# Patient Record
Sex: FEMALE | Race: WHITE | NOT HISPANIC OR LATINO | Employment: UNEMPLOYED | URBAN - METROPOLITAN AREA
[De-identification: names, ages, dates, MRNs, and addresses within clinical notes are randomized per-mention and may not be internally consistent; named-entity substitution may affect disease eponyms.]

---

## 2023-08-11 ENCOUNTER — HOSPITAL ENCOUNTER (EMERGENCY)
Facility: HOSPITAL | Age: 38
Discharge: HOME | End: 2023-08-11
Attending: EMERGENCY MEDICINE
Payer: MEDICAID

## 2023-08-11 VITALS
TEMPERATURE: 99 F | SYSTOLIC BLOOD PRESSURE: 164 MMHG | HEART RATE: 89 BPM | BODY MASS INDEX: 48.76 KG/M2 | HEIGHT: 62 IN | WEIGHT: 265 LBS | OXYGEN SATURATION: 97 % | RESPIRATION RATE: 18 BRPM | DIASTOLIC BLOOD PRESSURE: 97 MMHG

## 2023-08-11 DIAGNOSIS — S39.012A STRAIN OF LUMBAR REGION, INITIAL ENCOUNTER: ICD-10-CM
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
## 2023-08-11 DIAGNOSIS — S09.90XA MINOR CLOSED HEAD INJURY: ICD-10-CM

## 2023-08-11 DIAGNOSIS — S00.03XA CONTUSION OF SCALP, INITIAL ENCOUNTER: ICD-10-CM

## 2023-08-11 LAB
B-HCG UR QL: NEGATIVE
CTP QC/QA: YES

## 2023-08-11 PROCEDURE — 81025 URINE PREGNANCY TEST: CPT | Performed by: NURSE PRACTITIONER

## 2023-08-11 PROCEDURE — 99284 EMERGENCY DEPT VISIT MOD MDM: CPT | Mod: 25
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
## 2023-08-12 NOTE — DISCHARGE INSTRUCTIONS
RETURN TO EMERGENCY DEPARTMENT WITHOUT FAIL, IF YOUR SYMPTOMS WORSEN, IF YOU GET NEW OR DIFFERENT SYMPTOMS, IF YOU ARE UNABLE TO FOLLOW UP AS DIRECTED, OR IF YOU HAVE ANY CONCERNS OR WORRIES.    Take your chronic pain medication as directed.  Take muscle relaxer as needed for pain.  Follow-up with primary care provider.
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
## 2023-09-07 DIAGNOSIS — G89.29 CHRONIC MIDLINE LOW BACK PAIN WITH SCIATICA, SCIATICA LATERALITY UNSPECIFIED: ICD-10-CM

## 2023-09-07 DIAGNOSIS — M54.2 NECK PAIN: Primary | ICD-10-CM

## 2023-09-07 DIAGNOSIS — M54.10 RADICULOPATHY AFFECTING UPPER EXTREMITY: ICD-10-CM

## 2023-09-07 DIAGNOSIS — M54.40 CHRONIC MIDLINE LOW BACK PAIN WITH SCIATICA, SCIATICA LATERALITY UNSPECIFIED: ICD-10-CM

## 2023-09-21 ENCOUNTER — HOSPITAL ENCOUNTER (OUTPATIENT)
Dept: RADIOLOGY | Facility: HOSPITAL | Age: 38
Discharge: HOME | End: 2023-09-21
Attending: INTERNAL MEDICINE
Payer: MEDICAID

## 2023-09-21 DIAGNOSIS — M54.2 NECK PAIN: ICD-10-CM

## 2023-09-21 DIAGNOSIS — G89.29 CHRONIC MIDLINE LOW BACK PAIN WITH SCIATICA, SCIATICA LATERALITY UNSPECIFIED: ICD-10-CM

## 2023-09-21 DIAGNOSIS — M54.10 RADICULOPATHY AFFECTING UPPER EXTREMITY: ICD-10-CM

## 2023-09-21 DIAGNOSIS — M54.40 CHRONIC MIDLINE LOW BACK PAIN WITH SCIATICA, SCIATICA LATERALITY UNSPECIFIED: ICD-10-CM

## 2023-09-21 PROCEDURE — 72148 MRI LUMBAR SPINE WITHOUT CONTRAST: ICD-10-PCS | Mod: 26,,, | Performed by: RADIOLOGY

## 2023-09-21 PROCEDURE — 72141 MRI CERVICAL SPINE WITHOUT CONTRAST: ICD-10-PCS | Mod: 26,,, | Performed by: RADIOLOGY

## 2023-09-21 PROCEDURE — 72141 MRI NECK SPINE W/O DYE: CPT | Mod: 26,,, | Performed by: RADIOLOGY

## 2023-09-21 PROCEDURE — 72148 MRI LUMBAR SPINE W/O DYE: CPT | Mod: TC

## 2023-09-21 PROCEDURE — 72141 MRI NECK SPINE W/O DYE: CPT | Mod: TC

## 2023-09-21 PROCEDURE — 72148 MRI LUMBAR SPINE W/O DYE: CPT | Mod: 26,,, | Performed by: RADIOLOGY

## 2024-01-23 ENCOUNTER — LAB VISIT (OUTPATIENT)
Dept: LAB | Facility: HOSPITAL | Age: 39
End: 2024-01-23
Attending: NURSE PRACTITIONER
Payer: MEDICAID

## 2024-01-23 DIAGNOSIS — G62.9 PERIPHERAL NERVE DISORDER: Primary | ICD-10-CM

## 2024-01-23 LAB
ALBUMIN SERPL BCP-MCNC: 4.2 G/DL (ref 3.5–5.2)
ALBUMIN SERPL BCP-MCNC: 4.2 G/DL (ref 3.5–5.2)
ALP SERPL-CCNC: 90 U/L (ref 55–135)
ALP SERPL-CCNC: 90 U/L (ref 55–135)
ALT SERPL W/O P-5'-P-CCNC: 31 U/L (ref 10–44)
ALT SERPL W/O P-5'-P-CCNC: 31 U/L (ref 10–44)
ANION GAP SERPL CALC-SCNC: 7 MMOL/L (ref 8–16)
AST SERPL-CCNC: 30 U/L (ref 10–40)
AST SERPL-CCNC: 30 U/L (ref 10–40)
BASOPHILS # BLD AUTO: 0.05 K/UL (ref 0–0.2)
BASOPHILS NFR BLD: 0.5 % (ref 0–1.9)
BILIRUB DIRECT SERPL-MCNC: 0.1 MG/DL (ref 0.1–0.3)
BILIRUB SERPL-MCNC: 0.4 MG/DL (ref 0.1–1)
BILIRUB SERPL-MCNC: 0.4 MG/DL (ref 0.1–1)
BILIRUB UR QL STRIP: NEGATIVE
BUN SERPL-MCNC: 11 MG/DL (ref 6–20)
CALCIUM SERPL-MCNC: 9.3 MG/DL (ref 8.7–10.5)
CHLORIDE SERPL-SCNC: 100 MMOL/L (ref 95–110)
CLARITY UR: CLEAR
CO2 SERPL-SCNC: 29 MMOL/L (ref 23–29)
COLOR UR: YELLOW
CREAT SERPL-MCNC: 0.7 MG/DL (ref 0.5–1.4)
CREAT SERPL-MCNC: 0.7 MG/DL (ref 0.5–1.4)
DIFFERENTIAL METHOD BLD: ABNORMAL
EOSINOPHIL # BLD AUTO: 0.3 K/UL (ref 0–0.5)
EOSINOPHIL NFR BLD: 3.2 % (ref 0–8)
ERYTHROCYTE [DISTWIDTH] IN BLOOD BY AUTOMATED COUNT: 14 % (ref 11.5–14.5)
ERYTHROCYTE [SEDIMENTATION RATE] IN BLOOD BY WESTERGREN METHOD: 21 MM/HR (ref 0–20)
EST. GFR  (NO RACE VARIABLE): >60 ML/MIN/1.73 M^2
EST. GFR  (NO RACE VARIABLE): >60 ML/MIN/1.73 M^2
ESTIMATED AVG GLUCOSE: 114 MG/DL (ref 68–131)
FOLATE SERPL-MCNC: 6.9 NG/ML (ref 4–24)
GLUCOSE SERPL-MCNC: 81 MG/DL (ref 70–110)
GLUCOSE UR QL STRIP: NEGATIVE
HBA1C MFR BLD: 5.6 % (ref 4.5–6.2)
HCT VFR BLD AUTO: 39.9 % (ref 37–48.5)
HGB BLD-MCNC: 12.6 G/DL (ref 12–16)
HGB UR QL STRIP: NEGATIVE
IMM GRANULOCYTES # BLD AUTO: 0.03 K/UL (ref 0–0.04)
IMM GRANULOCYTES NFR BLD AUTO: 0.3 % (ref 0–0.5)
KETONES UR QL STRIP: NEGATIVE
LEUKOCYTE ESTERASE UR QL STRIP: NEGATIVE
LYMPHOCYTES # BLD AUTO: 3.2 K/UL (ref 1–4.8)
LYMPHOCYTES NFR BLD: 33.1 % (ref 18–48)
MCH RBC QN AUTO: 28.4 PG (ref 27–31)
MCHC RBC AUTO-ENTMCNC: 31.6 G/DL (ref 32–36)
MCV RBC AUTO: 90 FL (ref 82–98)
MONOCYTES # BLD AUTO: 0.6 K/UL (ref 0.3–1)
MONOCYTES NFR BLD: 5.6 % (ref 4–15)
NEUTROPHILS # BLD AUTO: 5.6 K/UL (ref 1.8–7.7)
NEUTROPHILS NFR BLD: 57.3 % (ref 38–73)
NITRITE UR QL STRIP: NEGATIVE
NRBC BLD-RTO: 0 /100 WBC
PH UR STRIP: 8 [PH] (ref 5–8)
PLATELET # BLD AUTO: 276 K/UL (ref 150–450)
PLATELET BLD QL SMEAR: ABNORMAL
PMV BLD AUTO: 11.4 FL (ref 9.2–12.9)
POTASSIUM SERPL-SCNC: 4.1 MMOL/L (ref 3.5–5.1)
PROT SERPL-MCNC: 7.8 G/DL (ref 6–8.4)
PROT SERPL-MCNC: 7.8 G/DL (ref 6–8.4)
PROT UR QL STRIP: NEGATIVE
RBC # BLD AUTO: 4.43 M/UL (ref 4–5.4)
RHEUMATOID FACT SERPL-ACNC: <10 IU/ML (ref 0–15)
SODIUM SERPL-SCNC: 136 MMOL/L (ref 136–145)
SP GR UR STRIP: 1.02 (ref 1–1.03)
T4 FREE SERPL-MCNC: 0.81 NG/DL (ref 0.71–1.51)
TSH SERPL DL<=0.005 MIU/L-ACNC: 6.19 UIU/ML (ref 0.34–5.6)
URN SPEC COLLECT METH UR: ABNORMAL
UROBILINOGEN UR STRIP-ACNC: ABNORMAL EU/DL
VIT B12 SERPL-MCNC: 240 PG/ML (ref 210–950)
WBC # BLD AUTO: 9.76 K/UL (ref 3.9–12.7)

## 2024-01-23 PROCEDURE — 84443 ASSAY THYROID STIM HORMONE: CPT | Performed by: NURSE PRACTITIONER

## 2024-01-23 PROCEDURE — 86803 HEPATITIS C AB TEST: CPT | Performed by: NURSE PRACTITIONER

## 2024-01-23 PROCEDURE — 82746 ASSAY OF FOLIC ACID SERUM: CPT | Performed by: NURSE PRACTITIONER

## 2024-01-23 PROCEDURE — 86038 ANTINUCLEAR ANTIBODIES: CPT | Performed by: NURSE PRACTITIONER

## 2024-01-23 PROCEDURE — 82595 ASSAY OF CRYOGLOBULIN: CPT | Performed by: NURSE PRACTITIONER

## 2024-01-23 PROCEDURE — 86335 IMMUNFIX E-PHORSIS/URINE/CSF: CPT | Performed by: NURSE PRACTITIONER

## 2024-01-23 PROCEDURE — 85651 RBC SED RATE NONAUTOMATED: CPT | Performed by: NURSE PRACTITIONER

## 2024-01-23 PROCEDURE — 86235 NUCLEAR ANTIGEN ANTIBODY: CPT | Performed by: NURSE PRACTITIONER

## 2024-01-23 PROCEDURE — 84439 ASSAY OF FREE THYROXINE: CPT | Performed by: NURSE PRACTITIONER

## 2024-01-23 PROCEDURE — 30000890 HC MISC. SEND OUT TEST

## 2024-01-23 PROCEDURE — 84166 PROTEIN E-PHORESIS/URINE/CSF: CPT | Performed by: NURSE PRACTITIONER

## 2024-01-23 PROCEDURE — 86704 HEP B CORE ANTIBODY TOTAL: CPT | Mod: 91 | Performed by: NURSE PRACTITIONER

## 2024-01-23 PROCEDURE — 83520 IMMUNOASSAY QUANT NOS NONAB: CPT | Mod: 91 | Performed by: NURSE PRACTITIONER

## 2024-01-23 PROCEDURE — 83921 ORGANIC ACID SINGLE QUANT: CPT | Performed by: NURSE PRACTITIONER

## 2024-01-23 PROCEDURE — 86225 DNA ANTIBODY NATIVE: CPT | Performed by: NURSE PRACTITIONER

## 2024-01-23 PROCEDURE — 84207 ASSAY OF VITAMIN B-6: CPT | Performed by: NURSE PRACTITIONER

## 2024-01-23 PROCEDURE — 80053 COMPREHEN METABOLIC PANEL: CPT | Performed by: NURSE PRACTITIONER

## 2024-01-23 PROCEDURE — 36415 COLL VENOUS BLD VENIPUNCTURE: CPT | Performed by: NURSE PRACTITIONER

## 2024-01-23 PROCEDURE — 86037 ANCA TITER EACH ANTIBODY: CPT | Mod: 91 | Performed by: NURSE PRACTITIONER

## 2024-01-23 PROCEDURE — 87340 HEPATITIS B SURFACE AG IA: CPT

## 2024-01-23 PROCEDURE — 30000890 LABCORP MISCELLANEOUS TEST: Mod: 91 | Performed by: NURSE PRACTITIONER

## 2024-01-23 PROCEDURE — 81003 URINALYSIS AUTO W/O SCOPE: CPT | Performed by: NURSE PRACTITIONER

## 2024-01-23 PROCEDURE — 86160 COMPLEMENT ANTIGEN: CPT | Mod: 59 | Performed by: NURSE PRACTITIONER

## 2024-01-23 PROCEDURE — 86706 HEP B SURFACE ANTIBODY: CPT

## 2024-01-23 PROCEDURE — 84425 ASSAY OF VITAMIN B-1: CPT | Performed by: NURSE PRACTITIONER

## 2024-01-23 PROCEDURE — 84165 PROTEIN E-PHORESIS SERUM: CPT | Performed by: NURSE PRACTITIONER

## 2024-01-23 PROCEDURE — 86317 IMMUNOASSAY INFECTIOUS AGENT: CPT | Performed by: NURSE PRACTITIONER

## 2024-01-23 PROCEDURE — 30000890 MISCELLANEOUS SENDOUT TEST, BLOOD: Performed by: NURSE PRACTITIONER

## 2024-01-23 PROCEDURE — 86140 C-REACTIVE PROTEIN: CPT | Performed by: NURSE PRACTITIONER

## 2024-01-23 PROCEDURE — 87389 HIV-1 AG W/HIV-1&-2 AB AG IA: CPT | Performed by: NURSE PRACTITIONER

## 2024-01-23 PROCEDURE — 82607 VITAMIN B-12: CPT | Performed by: NURSE PRACTITIONER

## 2024-01-23 PROCEDURE — 82784 ASSAY IGA/IGD/IGG/IGM EACH: CPT | Mod: 91 | Performed by: NURSE PRACTITIONER

## 2024-01-23 PROCEDURE — 83036 HEMOGLOBIN GLYCOSYLATED A1C: CPT | Performed by: NURSE PRACTITIONER

## 2024-01-23 PROCEDURE — 85025 COMPLETE CBC W/AUTO DIFF WBC: CPT | Performed by: NURSE PRACTITIONER

## 2024-01-23 PROCEDURE — 86431 RHEUMATOID FACTOR QUANT: CPT | Performed by: NURSE PRACTITIONER

## 2024-01-23 PROCEDURE — 87476 LYME DIS DNA AMP PROBE: CPT | Mod: 91 | Performed by: NURSE PRACTITIONER

## 2024-01-24 ENCOUNTER — APPOINTMENT (OUTPATIENT)
Dept: LAB | Facility: HOSPITAL | Age: 39
End: 2024-01-24
Attending: NURSE PRACTITIONER
Payer: MEDICAID

## 2024-01-24 LAB
ANA TITR SER IF: NEGATIVE {TITER}
C3 SERPL-MCNC: 167 MG/DL (ref 82–167)
C4 SERPL-MCNC: 31 MG/DL (ref 12–38)
CRP SERPL-MCNC: 13.7 MG/L (ref 0–8.2)
DSDNA AB SER-ACNC: <1 IU/ML (ref 0–9)
HBV SURFACE AB SER-ACNC: <3.1 MIU/ML
HCV AB S/CO SERPL IA: NON REACTIVE
HIV 1+2 AB+HIV1 P24 AG SERPL QL IA: NON REACTIVE
IGA SERPL-MCNC: 255 MG/DL (ref 87–352)
IGG SERPL-MCNC: 1366 MG/DL (ref 586–1602)
IGM SERPL-MCNC: 130 MG/DL (ref 26–217)
INTERPRETATION UR IFE-IMP: NORMAL
LABCORP MISC TEST CODE: NORMAL
LABCORP MISC TEST NAME: NORMAL
LABCORP MISCELLANEOUS TEST: NORMAL
PROT PATTERN SERPL IFE-IMP: NORMAL

## 2024-01-24 PROCEDURE — 85598 HEXAGNAL PHOSPH PLTLT NEUTRL: CPT | Performed by: NURSE PRACTITIONER

## 2024-01-24 PROCEDURE — 86147 CARDIOLIPIN ANTIBODY EA IG: CPT

## 2024-01-24 PROCEDURE — 85613 RUSSELL VIPER VENOM DILUTED: CPT

## 2024-01-24 PROCEDURE — 85670 THROMBIN TIME PLASMA: CPT

## 2024-01-24 PROCEDURE — 85610 PROTHROMBIN TIME: CPT

## 2024-01-24 PROCEDURE — 30000890 LABCORP MISCELLANEOUS TEST: Performed by: NURSE PRACTITIONER

## 2024-01-24 PROCEDURE — 30000890 HC MISC. SEND OUT TEST

## 2024-01-24 PROCEDURE — 86235 NUCLEAR ANTIGEN ANTIBODY: CPT | Mod: 91 | Performed by: NURSE PRACTITIONER

## 2024-01-24 PROCEDURE — 86146 BETA-2 GLYCOPROTEIN ANTIBODY: CPT

## 2024-01-24 PROCEDURE — 85730 THROMBOPLASTIN TIME PARTIAL: CPT | Mod: 59

## 2024-01-25 LAB
ALBUMIN MFR UR ELPH: 55.6 %
ALBUMIN SERPL ELPH-MCNC: 3.4 G/DL (ref 2.9–4.4)
ALBUMIN SERPL ELPH-MCNC: 3.4 G/DL (ref 2.9–4.4)
ALBUMIN/GLOB SERPL: 0.8 {RATIO} (ref 0.7–1.7)
ALBUMIN/GLOB SERPL: 0.8 {RATIO} (ref 0.7–1.7)
ALPHA-2 GLOBULIN URINE RANDOM (ELEC): 0 %
ALPHA1 GLOB MFR UR ELPH: 0 %
ALPHA1 GLOB SERPL ELPH-MCNC: 0.3 G/DL (ref 0–0.4)
ALPHA1 GLOB SERPL ELPH-MCNC: 0.3 G/DL (ref 0–0.4)
ALPHA2 GLOB SERPL ELPH-MCNC: 0.9 G/DL (ref 0.4–1)
ALPHA2 GLOB SERPL ELPH-MCNC: 0.9 G/DL (ref 0.4–1)
B-GLOBULIN MFR UR ELPH: 0 %
B-GLOBULIN SERPL ELPH-MCNC: 1.3 G/DL (ref 0.7–1.3)
B-GLOBULIN SERPL ELPH-MCNC: 1.3 G/DL (ref 0.7–1.3)
GAMMA GLOB MFR UR ELPH: 0 %
GAMMA GLOB SERPL ELPH-MCNC: 1.6 G/DL (ref 0.4–1.8)
GAMMA GLOB SERPL ELPH-MCNC: 1.6 G/DL (ref 0.4–1.8)
GLOBULIN SER CALC-MCNC: 4.1 G/DL (ref 2.2–3.9)
GLOBULIN SER CALC-MCNC: 4.1 G/DL (ref 2.2–3.9)
LABCORP MISC TEST CODE: NORMAL
LABCORP MISC TEST NAME: NORMAL
LABCORP MISCELLANEOUS TEST: NORMAL
LABORATORY COMMENT REPORT: ABNORMAL
LABORATORY COMMENT REPORT: ABNORMAL
LABORATORY COMMENT REPORT: NORMAL
M PROTEIN MFR UR ELPH: NORMAL %
M PROTEIN SERPL ELPH-MCNC: ABNORMAL G/DL
M PROTEIN SERPL ELPH-MCNC: ABNORMAL G/DL
PROT SERPL-MCNC: 7.5 G/DL (ref 6–8.5)
PROT SERPL-MCNC: 7.5 G/DL (ref 6–8.5)
PROT UR-MCNC: 7.4 MG/DL

## 2024-01-27 LAB
LABCORP MISC TEST CODE: NORMAL
LABCORP MISC TEST NAME: NORMAL
LABCORP MISCELLANEOUS TEST: NORMAL
VIT B1 BLD-SCNC: 109.6 NMOL/L (ref 66.5–200)
VIT B6 SERPL-MCNC: 21.3 UG/L (ref 3.4–65.2)

## 2024-01-28 LAB — METHLYMALONIC ACID: 173 NMOL/L (ref 0–378)

## 2024-01-29 LAB — CRYOGLOB SER QL 1D COLD INC: NORMAL

## 2024-02-05 LAB
LABCORP MISC TEST CODE: NORMAL
LABCORP MISC TEST NAME: NORMAL
LABCORP MISCELLANEOUS TEST: NORMAL

## 2024-02-21 LAB
LABCORP MISC TEST CODE: 6338
LABCORP MISC TEST NAME: NORMAL
LABCORP MISCELLANEOUS TEST: NORMAL

## 2024-03-08 ENCOUNTER — LAB VISIT (OUTPATIENT)
Dept: LAB | Facility: HOSPITAL | Age: 39
End: 2024-03-08
Attending: NURSE PRACTITIONER
Payer: MEDICAID

## 2024-03-08 DIAGNOSIS — R70.0 ELEVATED SEDIMENTATION RATE: ICD-10-CM

## 2024-03-08 DIAGNOSIS — R20.2 PARESTHESIA: Primary | ICD-10-CM

## 2024-03-08 LAB — ERYTHROCYTE [SEDIMENTATION RATE] IN BLOOD BY WESTERGREN METHOD: 19 MM/HR (ref 0–20)

## 2024-03-08 PROCEDURE — 85651 RBC SED RATE NONAUTOMATED: CPT | Performed by: NURSE PRACTITIONER

## 2024-03-08 PROCEDURE — 36415 COLL VENOUS BLD VENIPUNCTURE: CPT | Performed by: NURSE PRACTITIONER

## 2024-03-28 ENCOUNTER — LAB VISIT (OUTPATIENT)
Dept: LAB | Facility: HOSPITAL | Age: 39
End: 2024-03-28
Attending: INTERNAL MEDICINE
Payer: MEDICAID

## 2024-03-28 DIAGNOSIS — I51.9 MYXEDEMA HEART DISEASE: Primary | ICD-10-CM

## 2024-03-28 DIAGNOSIS — E03.9 MYXEDEMA HEART DISEASE: Primary | ICD-10-CM

## 2024-03-28 LAB — TSH SERPL DL<=0.005 MIU/L-ACNC: 4.28 UIU/ML (ref 0.34–5.6)

## 2024-03-28 PROCEDURE — 36415 COLL VENOUS BLD VENIPUNCTURE: CPT | Performed by: INTERNAL MEDICINE

## 2024-03-28 PROCEDURE — 84443 ASSAY THYROID STIM HORMONE: CPT | Performed by: INTERNAL MEDICINE

## 2024-04-22 DIAGNOSIS — E04.9 ENLARGED THYROID: Primary | ICD-10-CM

## 2024-04-24 ENCOUNTER — HOSPITAL ENCOUNTER (OUTPATIENT)
Dept: RADIOLOGY | Facility: HOSPITAL | Age: 39
Discharge: HOME | End: 2024-04-24
Attending: INTERNAL MEDICINE
Payer: MEDICAID

## 2024-04-24 DIAGNOSIS — E04.9 ENLARGED THYROID: ICD-10-CM

## 2024-04-24 PROCEDURE — 76536 US EXAM OF HEAD AND NECK: CPT | Mod: TC

## 2024-05-23 ENCOUNTER — OFFICE VISIT (OUTPATIENT)
Dept: ORTHOPEDICS | Facility: CLINIC | Age: 39
End: 2024-05-23
Payer: MEDICAID

## 2024-05-23 VITALS — HEIGHT: 62 IN | WEIGHT: 265 LBS | BODY MASS INDEX: 48.76 KG/M2

## 2024-05-23 DIAGNOSIS — G56.02 CARPAL TUNNEL SYNDROME OF LEFT WRIST: ICD-10-CM

## 2024-05-23 DIAGNOSIS — G56.01 CARPAL TUNNEL SYNDROME OF RIGHT WRIST: Primary | ICD-10-CM

## 2024-05-23 PROCEDURE — 3008F BODY MASS INDEX DOCD: CPT | Mod: CPTII,S$GLB,, | Performed by: ORTHOPAEDIC SURGERY

## 2024-05-23 PROCEDURE — 99203 OFFICE O/P NEW LOW 30 MIN: CPT | Mod: S$GLB,,, | Performed by: ORTHOPAEDIC SURGERY

## 2024-05-23 PROCEDURE — 3044F HG A1C LEVEL LT 7.0%: CPT | Mod: CPTII,S$GLB,, | Performed by: ORTHOPAEDIC SURGERY

## 2024-05-23 PROCEDURE — 1159F MED LIST DOCD IN RCRD: CPT | Mod: CPTII,S$GLB,, | Performed by: ORTHOPAEDIC SURGERY

## 2024-05-23 PROCEDURE — 1160F RVW MEDS BY RX/DR IN RCRD: CPT | Mod: CPTII,S$GLB,, | Performed by: ORTHOPAEDIC SURGERY

## 2024-05-23 RX ORDER — NALOXONE HYDROCHLORIDE 4 MG/.1ML
SPRAY NASAL
COMMUNITY

## 2024-05-23 RX ORDER — AMITRIPTYLINE HYDROCHLORIDE 25 MG/1
TABLET, FILM COATED ORAL
COMMUNITY
Start: 2024-02-22

## 2024-05-23 RX ORDER — HYDROCODONE BITARTRATE AND ACETAMINOPHEN 5; 325 MG/1; MG/1
TABLET ORAL
COMMUNITY

## 2024-05-23 RX ORDER — BUPROPION HYDROCHLORIDE 150 MG/1
TABLET ORAL
COMMUNITY
Start: 2023-09-06

## 2024-05-23 RX ORDER — GABAPENTIN 300 MG/1
300 CAPSULE ORAL 4 TIMES DAILY
COMMUNITY

## 2024-05-23 RX ORDER — ACETAMINOPHEN 500 MG
1 TABLET ORAL EVERY MORNING
COMMUNITY

## 2024-05-23 RX ORDER — ERGOCALCIFEROL 1.25 MG/1
1 CAPSULE ORAL
COMMUNITY

## 2024-05-23 RX ORDER — PARSLEY 450 MG
2 CAPSULE ORAL
COMMUNITY

## 2024-05-23 RX ORDER — METHADONE HYDROCHLORIDE 10 MG/1
TABLET ORAL
COMMUNITY

## 2024-05-23 RX ORDER — DULOXETIN HYDROCHLORIDE 30 MG/1
30 CAPSULE, DELAYED RELEASE ORAL
COMMUNITY
Start: 2024-05-02

## 2024-05-23 RX ORDER — LEVOTHYROXINE SODIUM 50 UG/1
50 TABLET ORAL EVERY MORNING
COMMUNITY

## 2024-05-23 RX ORDER — MULTIVITAMIN
1 TABLET ORAL DAILY
COMMUNITY

## 2024-05-23 NOTE — PROGRESS NOTES
[facility]    Subjective:     Chief Complaint:   Chief Complaint   Patient presents with    Left Hand - Pain     Patient is here for bilateral CTR, had an EMG about 6 months ago and had an injection 5. pain has been occurring for 2 years. Numbness and tingling in both hands, right hand is worse     Right Hand - Pain       No past medical history on file.    No past surgical history on file.    Current Outpatient Medications   Medication Sig    amitriptyline (ELAVIL) 25 MG tablet     buPROPion (WELLBUTRIN XL) 150 MG TB24 tablet 1 tablet in the morning Orally Once a day    DULoxetine (CYMBALTA) 30 MG capsule Take 30 mg by mouth.    ashwagandha root extract 500 mg Cap Take 2 capsules by mouth.    cholecalciferol, vitamin D3, 125 mcg (5,000 unit) Tab Take 1 tablet by mouth every morning.    ergocalciferol (ERGOCALCIFEROL) 50,000 unit Cap Take 1 capsule by mouth every 7 days.    gabapentin (NEURONTIN) 300 MG capsule Take 300 mg by mouth 4 (four) times daily.    HYDROcodone-acetaminophen (NORCO) 5-325 mg per tablet SMARTSI Tablet(s) By Mouth Every 12 Hours PRN    levothyroxine (SYNTHROID) 50 MCG tablet Take 50 mcg by mouth every morning.    methadone (DOLOPHINE) 10 MG tablet SMARTSIG:3 Tablet(s) By Mouth Every 8 Hours    multivitamin (THERAGRAN) per tablet Take 1 tablet by mouth once daily.    naloxone (NARCAN) 4 mg/actuation Spry      No current facility-administered medications for this visit.       Review of patient's allergies indicates:   Allergen Reactions    Poppyseed oil Hives    Shark cartilage Hives       No family history on file.    Social History     Socioeconomic History    Marital status: Single       History of present illness:  Patient comes in today for bilateral hand numbness and tingling.  This has been going on for 2 years of bothers her at night.  She drops objects.  She did see a neurologist and neuro care who did nerve conduction studies and determined that she would bilateral  carpal tunnel syndrome      Review of Systems:    Constitution: Negative for chills, fever, and sweats.  Negative for unexplained weight loss.    HENT:  Negative for headaches and blurry vision.    Cardiovascular:Negative for chest pain or irregular heart beat. Negative for hypertension.    Respiratory:  Negative for cough and shortness of breath.    Gastrointestinal: Negative for abdominal pain, heartburn, melena, nausea, and vomitting.    Genitourinary:  Negative bladder incontinence and dysuria.    Musculoskeletal:  See HPI for details.     Neurological: Negative for numbness.    Psychiatric/Behavioral: Negative for depression.  The patient is not nervous/anxious.      Endocrine: Negative for polyuria    Hematologic/Lymphatic: Negative for bleeding problem.  Does not bruise/bleed easily.    Skin: Negative for poor would healing and rash    Objective:      Physical Examination:    Vital Signs:  There were no vitals filed for this visit.    Body mass index is 48.47 kg/m².    This a well-developed, well nourished patient in no acute distress.  They are alert and oriented and cooperative to examination.        Patient has a positive Tinel's bilaterally Spurling sign is negative full range of motion of the shoulders elbows fingers and thumb.  Decrease in sensation subjectively in the median digits  Pertinent New Results:    XRAY Report / Interpretation:       Assessment/Plan:      Carpal tunnel syndrome bilaterally I have offered her right carpal tunnel release.  I explained to her that all we can do is decompressed the nerve.  The result will be dependent on the nerves ability to recover.  We spoke about other complications like injury to the recurrent branch of the median nerve and pillar pain she clearly understood and wished to proceed      This note was created using Dragon voice recognition software that occasionally misinterpreted phrases or words.

## 2024-07-09 DIAGNOSIS — R20.2 PARESTHESIA: Primary | ICD-10-CM

## 2024-07-09 DIAGNOSIS — G62.9 ACQUIRED POLYNEUROPATHY: ICD-10-CM

## 2024-07-16 ENCOUNTER — LAB VISIT (OUTPATIENT)
Dept: LAB | Facility: HOSPITAL | Age: 39
End: 2024-07-16
Attending: NURSE PRACTITIONER
Payer: MEDICAID

## 2024-07-16 DIAGNOSIS — G62.9 PERIPHERAL NERVE DISORDER: Primary | ICD-10-CM

## 2024-07-16 DIAGNOSIS — R70.0 ELEVATED SEDIMENTATION RATE: ICD-10-CM

## 2024-07-16 LAB
CRP SERPL-MCNC: 1.1 MG/DL
ERYTHROCYTE [SEDIMENTATION RATE] IN BLOOD BY WESTERGREN METHOD: 19 MM/HR (ref 0–20)
RHEUMATOID FACT SERPL-ACNC: <10 IU/ML (ref 0–15)

## 2024-07-16 PROCEDURE — 86431 RHEUMATOID FACTOR QUANT: CPT | Performed by: NURSE PRACTITIONER

## 2024-07-16 PROCEDURE — 85651 RBC SED RATE NONAUTOMATED: CPT | Performed by: NURSE PRACTITIONER

## 2024-07-16 PROCEDURE — 36415 COLL VENOUS BLD VENIPUNCTURE: CPT | Performed by: NURSE PRACTITIONER

## 2024-07-16 PROCEDURE — 86140 C-REACTIVE PROTEIN: CPT | Performed by: NURSE PRACTITIONER

## 2024-10-10 ENCOUNTER — HOSPITAL ENCOUNTER (OUTPATIENT)
Facility: HOSPITAL | Age: 39
Discharge: HOME | End: 2024-10-12
Attending: STUDENT IN AN ORGANIZED HEALTH CARE EDUCATION/TRAINING PROGRAM | Admitting: INTERNAL MEDICINE
Payer: MEDICAID

## 2024-10-10 DIAGNOSIS — R07.9 CHEST PAIN: ICD-10-CM

## 2024-10-10 DIAGNOSIS — K81.0 ACUTE CHOLECYSTITIS: Primary | ICD-10-CM

## 2024-10-10 DIAGNOSIS — R10.9 ABDOMINAL PAIN: ICD-10-CM

## 2024-10-10 LAB
ALBUMIN SERPL BCP-MCNC: 4.3 G/DL (ref 3.5–5.2)
ALP SERPL-CCNC: 94 U/L (ref 55–135)
ALT SERPL W/O P-5'-P-CCNC: 35 U/L (ref 10–44)
ANION GAP SERPL CALC-SCNC: 7 MMOL/L (ref 8–16)
AST SERPL-CCNC: 43 U/L (ref 10–40)
B-HCG UR QL: NEGATIVE
BACTERIA #/AREA URNS HPF: ABNORMAL /HPF
BASOPHILS # BLD AUTO: 0.02 K/UL (ref 0–0.2)
BASOPHILS NFR BLD: 0.2 % (ref 0–1.9)
BILIRUB SERPL-MCNC: 0.8 MG/DL (ref 0.1–1)
BILIRUB UR QL STRIP: NEGATIVE
BUN SERPL-MCNC: 10 MG/DL (ref 6–20)
CALCIUM SERPL-MCNC: 9.2 MG/DL (ref 8.7–10.5)
CHLORIDE SERPL-SCNC: 101 MMOL/L (ref 95–110)
CLARITY UR: ABNORMAL
CO2 SERPL-SCNC: 29 MMOL/L (ref 23–29)
COLOR UR: YELLOW
CREAT SERPL-MCNC: 0.7 MG/DL (ref 0.5–1.4)
CTP QC/QA: YES
DIFFERENTIAL METHOD BLD: ABNORMAL
EOSINOPHIL # BLD AUTO: 0.1 K/UL (ref 0–0.5)
EOSINOPHIL NFR BLD: 1.2 % (ref 0–8)
ERYTHROCYTE [DISTWIDTH] IN BLOOD BY AUTOMATED COUNT: 13.7 % (ref 11.5–14.5)
EST. GFR  (NO RACE VARIABLE): >60 ML/MIN/1.73 M^2
GLUCOSE SERPL-MCNC: 94 MG/DL (ref 70–110)
GLUCOSE UR QL STRIP: NEGATIVE
HCT VFR BLD AUTO: 37.4 % (ref 37–48.5)
HGB BLD-MCNC: 12.5 G/DL (ref 12–16)
HGB UR QL STRIP: ABNORMAL
IMM GRANULOCYTES # BLD AUTO: 0.04 K/UL (ref 0–0.04)
IMM GRANULOCYTES NFR BLD AUTO: 0.3 % (ref 0–0.5)
KETONES UR QL STRIP: NEGATIVE
LEUKOCYTE ESTERASE UR QL STRIP: NEGATIVE
LIPASE SERPL-CCNC: 8 U/L (ref 4–60)
LYMPHOCYTES # BLD AUTO: 1.4 K/UL (ref 1–4.8)
LYMPHOCYTES NFR BLD: 11.8 % (ref 18–48)
MCH RBC QN AUTO: 30.3 PG (ref 27–31)
MCHC RBC AUTO-ENTMCNC: 33.4 G/DL (ref 32–36)
MCV RBC AUTO: 91 FL (ref 82–98)
MICROSCOPIC COMMENT: ABNORMAL
MONOCYTES # BLD AUTO: 0.7 K/UL (ref 0.3–1)
MONOCYTES NFR BLD: 6 % (ref 4–15)
NEUTROPHILS # BLD AUTO: 9.8 K/UL (ref 1.8–7.7)
NEUTROPHILS NFR BLD: 80.5 % (ref 38–73)
NITRITE UR QL STRIP: NEGATIVE
NRBC BLD-RTO: 0 /100 WBC
PH UR STRIP: 6 [PH] (ref 5–8)
PLATELET # BLD AUTO: 324 K/UL (ref 150–450)
PMV BLD AUTO: 10.4 FL (ref 9.2–12.9)
POTASSIUM SERPL-SCNC: 3.9 MMOL/L (ref 3.5–5.1)
PROT SERPL-MCNC: 8 G/DL (ref 6–8.4)
PROT UR QL STRIP: ABNORMAL
RBC # BLD AUTO: 4.13 M/UL (ref 4–5.4)
RBC #/AREA URNS HPF: 10 /HPF (ref 0–4)
SODIUM SERPL-SCNC: 137 MMOL/L (ref 136–145)
SP GR UR STRIP: 1.02 (ref 1–1.03)
SQUAMOUS #/AREA URNS HPF: 3 /HPF
URN SPEC COLLECT METH UR: ABNORMAL
UROBILINOGEN UR STRIP-ACNC: ABNORMAL EU/DL
WBC # BLD AUTO: 12.17 K/UL (ref 3.9–12.7)
WBC #/AREA URNS HPF: 1 /HPF (ref 0–5)

## 2024-10-10 PROCEDURE — G0378 HOSPITAL OBSERVATION PER HR: HCPCS

## 2024-10-10 PROCEDURE — 99285 EMERGENCY DEPT VISIT HI MDM: CPT | Mod: 25

## 2024-10-10 PROCEDURE — 81025 URINE PREGNANCY TEST: CPT | Performed by: NURSE PRACTITIONER

## 2024-10-10 PROCEDURE — 96375 TX/PRO/DX INJ NEW DRUG ADDON: CPT

## 2024-10-10 PROCEDURE — 81001 URINALYSIS AUTO W/SCOPE: CPT | Performed by: NURSE PRACTITIONER

## 2024-10-10 PROCEDURE — 25000003 PHARM REV CODE 250: Performed by: STUDENT IN AN ORGANIZED HEALTH CARE EDUCATION/TRAINING PROGRAM

## 2024-10-10 PROCEDURE — 63600175 PHARM REV CODE 636 W HCPCS: Performed by: STUDENT IN AN ORGANIZED HEALTH CARE EDUCATION/TRAINING PROGRAM

## 2024-10-10 PROCEDURE — 85025 COMPLETE CBC W/AUTO DIFF WBC: CPT | Performed by: NURSE PRACTITIONER

## 2024-10-10 PROCEDURE — 25500020 PHARM REV CODE 255: Performed by: STUDENT IN AN ORGANIZED HEALTH CARE EDUCATION/TRAINING PROGRAM

## 2024-10-10 PROCEDURE — 25000003 PHARM REV CODE 250: Performed by: INTERNAL MEDICINE

## 2024-10-10 PROCEDURE — 83690 ASSAY OF LIPASE: CPT | Performed by: NURSE PRACTITIONER

## 2024-10-10 PROCEDURE — 63600175 PHARM REV CODE 636 W HCPCS: Performed by: INTERNAL MEDICINE

## 2024-10-10 PROCEDURE — 80053 COMPREHEN METABOLIC PANEL: CPT | Performed by: NURSE PRACTITIONER

## 2024-10-10 RX ORDER — CIPROFLOXACIN 500 MG/1
500 TABLET ORAL
Status: COMPLETED | OUTPATIENT
Start: 2024-10-10 | End: 2024-10-10

## 2024-10-10 RX ORDER — ALUMINUM HYDROXIDE, MAGNESIUM HYDROXIDE, AND SIMETHICONE 1200; 120; 1200 MG/30ML; MG/30ML; MG/30ML
30 SUSPENSION ORAL 4 TIMES DAILY PRN
Status: DISCONTINUED | OUTPATIENT
Start: 2024-10-10 | End: 2024-10-12 | Stop reason: HOSPADM

## 2024-10-10 RX ORDER — IBUPROFEN 200 MG
24 TABLET ORAL
Status: DISCONTINUED | OUTPATIENT
Start: 2024-10-10 | End: 2024-10-12 | Stop reason: HOSPADM

## 2024-10-10 RX ORDER — NALOXONE HCL 0.4 MG/ML
0.02 VIAL (ML) INJECTION
Status: DISCONTINUED | OUTPATIENT
Start: 2024-10-10 | End: 2024-10-12 | Stop reason: HOSPADM

## 2024-10-10 RX ORDER — METRONIDAZOLE 250 MG/1
500 TABLET ORAL
Status: COMPLETED | OUTPATIENT
Start: 2024-10-10 | End: 2024-10-10

## 2024-10-10 RX ORDER — IBUPROFEN 200 MG
16 TABLET ORAL
Status: DISCONTINUED | OUTPATIENT
Start: 2024-10-10 | End: 2024-10-12 | Stop reason: HOSPADM

## 2024-10-10 RX ORDER — SODIUM,POTASSIUM PHOSPHATES 280-250MG
2 POWDER IN PACKET (EA) ORAL
Status: DISCONTINUED | OUTPATIENT
Start: 2024-10-10 | End: 2024-10-12 | Stop reason: HOSPADM

## 2024-10-10 RX ORDER — LANOLIN ALCOHOL/MO/W.PET/CERES
800 CREAM (GRAM) TOPICAL
Status: DISCONTINUED | OUTPATIENT
Start: 2024-10-10 | End: 2024-10-12 | Stop reason: HOSPADM

## 2024-10-10 RX ORDER — ACETAMINOPHEN 325 MG/1
650 TABLET ORAL EVERY 8 HOURS PRN
Status: DISCONTINUED | OUTPATIENT
Start: 2024-10-10 | End: 2024-10-12 | Stop reason: HOSPADM

## 2024-10-10 RX ORDER — AMOXICILLIN 250 MG
2 CAPSULE ORAL 2 TIMES DAILY
Status: DISCONTINUED | OUTPATIENT
Start: 2024-10-10 | End: 2024-10-12 | Stop reason: HOSPADM

## 2024-10-10 RX ORDER — ONDANSETRON HYDROCHLORIDE 2 MG/ML
4 INJECTION, SOLUTION INTRAVENOUS EVERY 6 HOURS PRN
Status: DISCONTINUED | OUTPATIENT
Start: 2024-10-10 | End: 2024-10-12 | Stop reason: HOSPADM

## 2024-10-10 RX ORDER — GLUCAGON 1 MG
1 KIT INJECTION
Status: DISCONTINUED | OUTPATIENT
Start: 2024-10-10 | End: 2024-10-12 | Stop reason: HOSPADM

## 2024-10-10 RX ORDER — FAMOTIDINE 10 MG/ML
20 INJECTION INTRAVENOUS 2 TIMES DAILY
Status: DISCONTINUED | OUTPATIENT
Start: 2024-10-10 | End: 2024-10-12 | Stop reason: HOSPADM

## 2024-10-10 RX ORDER — KETOROLAC TROMETHAMINE 30 MG/ML
15 INJECTION, SOLUTION INTRAMUSCULAR; INTRAVENOUS
Status: COMPLETED | OUTPATIENT
Start: 2024-10-10 | End: 2024-10-10

## 2024-10-10 RX ORDER — HYDROMORPHONE HYDROCHLORIDE 1 MG/ML
0.5 INJECTION, SOLUTION INTRAMUSCULAR; INTRAVENOUS; SUBCUTANEOUS
Status: DISCONTINUED | OUTPATIENT
Start: 2024-10-10 | End: 2024-10-12 | Stop reason: HOSPADM

## 2024-10-10 RX ORDER — IBUPROFEN 800 MG/1
800 TABLET ORAL EVERY 8 HOURS PRN
Status: ON HOLD | COMMUNITY
End: 2024-10-12 | Stop reason: HOSPADM

## 2024-10-10 RX ORDER — DICLOFENAC SODIUM 10 MG/G
4 GEL TOPICAL DAILY
Status: ON HOLD | COMMUNITY
Start: 2024-09-18 | End: 2024-10-11

## 2024-10-10 RX ORDER — SODIUM CHLORIDE 0.9 % (FLUSH) 0.9 %
10 SYRINGE (ML) INJECTION EVERY 12 HOURS PRN
Status: DISCONTINUED | OUTPATIENT
Start: 2024-10-10 | End: 2024-10-12 | Stop reason: HOSPADM

## 2024-10-10 RX ORDER — SODIUM CHLORIDE AND POTASSIUM CHLORIDE 150; 900 MG/100ML; MG/100ML
INJECTION, SOLUTION INTRAVENOUS CONTINUOUS
Status: DISPENSED | OUTPATIENT
Start: 2024-10-10 | End: 2024-10-11

## 2024-10-10 RX ORDER — ACETAMINOPHEN 325 MG/1
650 TABLET ORAL EVERY 4 HOURS PRN
Status: DISCONTINUED | OUTPATIENT
Start: 2024-10-10 | End: 2024-10-12 | Stop reason: HOSPADM

## 2024-10-10 RX ORDER — PREGABALIN 50 MG/1
1 CAPSULE ORAL 2 TIMES DAILY
Status: ON HOLD | COMMUNITY
Start: 2024-07-05 | End: 2024-10-11

## 2024-10-10 RX ORDER — TALC
6 POWDER (GRAM) TOPICAL NIGHTLY PRN
Status: DISCONTINUED | OUTPATIENT
Start: 2024-10-10 | End: 2024-10-12 | Stop reason: HOSPADM

## 2024-10-10 RX ADMIN — SODIUM CHLORIDE AND POTASSIUM CHLORIDE: 9; 1.49 INJECTION, SOLUTION INTRAVENOUS at 11:10

## 2024-10-10 RX ADMIN — KETOROLAC TROMETHAMINE 15 MG: 30 INJECTION, SOLUTION INTRAMUSCULAR; INTRAVENOUS at 05:10

## 2024-10-10 RX ADMIN — METRONIDAZOLE 500 MG: 250 TABLET ORAL at 09:10

## 2024-10-10 RX ADMIN — FAMOTIDINE 20 MG: 10 INJECTION INTRAVENOUS at 11:10

## 2024-10-10 RX ADMIN — IOHEXOL 100 ML: 350 INJECTION, SOLUTION INTRAVENOUS at 06:10

## 2024-10-10 RX ADMIN — CIPROFLOXACIN 500 MG: 500 TABLET ORAL at 09:10

## 2024-10-10 RX ADMIN — SENNOSIDES AND DOCUSATE SODIUM 2 TABLET: 8.6; 5 TABLET ORAL at 11:10

## 2024-10-10 NOTE — FIRST PROVIDER EVALUATION
Emergency Department TeleTriage Encounter Note      CHIEF COMPLAINT    Chief Complaint   Patient presents with    Abdominal Pain     Started yesterday, sharp pain throughout entire abdomen. Not worse with eating, no N/V/D. Describes pain as when pressing on stomach after child birth delivery.     Dizziness     Got lightheaded this am when moving but hasn't since.        VITAL SIGNS   Initial Vitals [10/10/24 1351]   BP Pulse Resp Temp SpO2   (!) 153/67 77 20 98.4 °F (36.9 °C) 99 %      MAP       --            ALLERGIES    Review of patient's allergies indicates:   Allergen Reactions    Poppyseed oil Hives    Shark cartilage Hives       PROVIDER TRIAGE NOTE  TeleTriage Note: Leona Salgado, a nontoxic/well appearing, 39 y.o. female, presented to the ED with c/o generalized abdominal pain since yesterday. Denies N/V/D or fevers.     All ED beds are full at present; patient notified of this status.  Patient seen and medically screened by Nurse Practitioner via teletriage. Orders initiated at triage to expedite care.  Patient is stable to return to the waiting room and will be placed in an ED bed when available.  Care will be transferred to an alternate provider when patient has been placed in an Exam Room from the [facility] for physical exam, additional orders, and disposition.  2:00 PM Makenna Valiente DNP, FNP-C        ORDERS  Labs Reviewed   CBC W/ AUTO DIFFERENTIAL   COMPREHENSIVE METABOLIC PANEL   LIPASE   URINALYSIS, REFLEX TO URINE CULTURE   POCT URINE PREGNANCY       ED Orders (720h ago, onward)      Start Ordered     Status Ordering Provider    10/10/24 1402 10/10/24 1401  Vital signs  Every 2 hours         Ordered MAKENNA VALIENTE    10/10/24 1402 10/10/24 1401  Diet NPO  Diet effective now         Ordered MAKENNA VALIENTE    10/10/24 1402 10/10/24 1401  Insert peripheral IV  Once         Ordered MAKENNA VALIENTE    10/10/24 1402 10/10/24 1401  CBC W/ AUTO DIFFERENTIAL  STAT         Ordered MAKENNA VALIENTE  JOSEF.    10/10/24 1402 10/10/24 1401  Comp. Metabolic Panel  STAT         Ordered SHAKIRA, ELVIN BAHENAReyes    10/10/24 1402 10/10/24 1401  Lipase  STAT         Ordered SHAKIRA, ELVIN BAHENAReyes    10/10/24 1402 10/10/24 1401  Urinalysis, Reflex to Urine Culture Urine, Clean Catch  STAT         Ordered SHAKIRA ELVIN BAHENAReyes    10/10/24 1402 10/10/24 1401  POCT urine pregnancy  Once         Ordered SHAKIRA ELVIN BAHENAReyes              Virtual Visit Note: The provider triage portion of this emergency department evaluation and documentation was performed via j-Grab, a HIPAA-compliant telemedicine application, in concert with a tele-presenter in the room. A face to face patient evaluation with one of my colleagues will occur once the patient is placed in an emergency department room.      DISCLAIMER: This note was prepared with [employer] voice recognition transcription software. Garbled syntax, mangled pronouns, and other bizarre constructions may be attributed to that software system.

## 2024-10-11 PROBLEM — R10.9 ABDOMINAL PAIN: Status: ACTIVE | Noted: 2024-10-11

## 2024-10-11 PROBLEM — K81.0 ACUTE CHOLECYSTITIS: Status: ACTIVE | Noted: 2024-10-11

## 2024-10-11 LAB
ALBUMIN SERPL BCP-MCNC: 3.8 G/DL (ref 3.5–5.2)
ALP SERPL-CCNC: 84 U/L (ref 55–135)
ALT SERPL W/O P-5'-P-CCNC: 35 U/L (ref 10–44)
ANION GAP SERPL CALC-SCNC: 5 MMOL/L (ref 8–16)
AST SERPL-CCNC: 41 U/L (ref 10–40)
BASOPHILS # BLD AUTO: 0.02 K/UL (ref 0–0.2)
BASOPHILS NFR BLD: 0.2 % (ref 0–1.9)
BILIRUB SERPL-MCNC: 0.4 MG/DL (ref 0.1–1)
BUN SERPL-MCNC: 12 MG/DL (ref 6–20)
CALCIUM SERPL-MCNC: 8.5 MG/DL (ref 8.7–10.5)
CHLORIDE SERPL-SCNC: 103 MMOL/L (ref 95–110)
CO2 SERPL-SCNC: 31 MMOL/L (ref 23–29)
CREAT SERPL-MCNC: 0.8 MG/DL (ref 0.5–1.4)
CRP SERPL-MCNC: 8.1 MG/DL
DIFFERENTIAL METHOD BLD: ABNORMAL
EOSINOPHIL # BLD AUTO: 0.2 K/UL (ref 0–0.5)
EOSINOPHIL NFR BLD: 1.9 % (ref 0–8)
ERYTHROCYTE [DISTWIDTH] IN BLOOD BY AUTOMATED COUNT: 13.8 % (ref 11.5–14.5)
EST. GFR  (NO RACE VARIABLE): >60 ML/MIN/1.73 M^2
ESTIMATED AVG GLUCOSE: 108 MG/DL (ref 68–131)
GLUCOSE SERPL-MCNC: 110 MG/DL (ref 70–110)
HBA1C MFR BLD: 5.4 % (ref 4.5–6.2)
HCT VFR BLD AUTO: 34.9 % (ref 37–48.5)
HGB BLD-MCNC: 11.3 G/DL (ref 12–16)
IMM GRANULOCYTES # BLD AUTO: 0.05 K/UL (ref 0–0.04)
IMM GRANULOCYTES NFR BLD AUTO: 0.5 % (ref 0–0.5)
LYMPHOCYTES # BLD AUTO: 1.7 K/UL (ref 1–4.8)
LYMPHOCYTES NFR BLD: 17 % (ref 18–48)
MAGNESIUM SERPL-MCNC: 2 MG/DL (ref 1.6–2.6)
MCH RBC QN AUTO: 29.7 PG (ref 27–31)
MCHC RBC AUTO-ENTMCNC: 32.4 G/DL (ref 32–36)
MCV RBC AUTO: 92 FL (ref 82–98)
MONOCYTES # BLD AUTO: 0.8 K/UL (ref 0.3–1)
MONOCYTES NFR BLD: 8.6 % (ref 4–15)
NEUTROPHILS # BLD AUTO: 7 K/UL (ref 1.8–7.7)
NEUTROPHILS NFR BLD: 71.8 % (ref 38–73)
NRBC BLD-RTO: 0 /100 WBC
OHS QRS DURATION: 72 MS
OHS QTC CALCULATION: 395 MS
PLATELET # BLD AUTO: 284 K/UL (ref 150–450)
PMV BLD AUTO: 10.1 FL (ref 9.2–12.9)
POTASSIUM SERPL-SCNC: 4.1 MMOL/L (ref 3.5–5.1)
PROT SERPL-MCNC: 7.3 G/DL (ref 6–8.4)
RBC # BLD AUTO: 3.81 M/UL (ref 4–5.4)
SODIUM SERPL-SCNC: 139 MMOL/L (ref 136–145)
WBC # BLD AUTO: 9.79 K/UL (ref 3.9–12.7)

## 2024-10-11 PROCEDURE — 99204 OFFICE O/P NEW MOD 45 MIN: CPT | Mod: ,,, | Performed by: SURGERY

## 2024-10-11 PROCEDURE — 96376 TX/PRO/DX INJ SAME DRUG ADON: CPT

## 2024-10-11 PROCEDURE — 83735 ASSAY OF MAGNESIUM: CPT | Performed by: INTERNAL MEDICINE

## 2024-10-11 PROCEDURE — 80053 COMPREHEN METABOLIC PANEL: CPT | Performed by: INTERNAL MEDICINE

## 2024-10-11 PROCEDURE — 96365 THER/PROPH/DIAG IV INF INIT: CPT

## 2024-10-11 PROCEDURE — 36415 COLL VENOUS BLD VENIPUNCTURE: CPT | Performed by: INTERNAL MEDICINE

## 2024-10-11 PROCEDURE — 63600175 PHARM REV CODE 636 W HCPCS: Performed by: INTERNAL MEDICINE

## 2024-10-11 PROCEDURE — G0378 HOSPITAL OBSERVATION PER HR: HCPCS

## 2024-10-11 PROCEDURE — 83036 HEMOGLOBIN GLYCOSYLATED A1C: CPT | Performed by: INTERNAL MEDICINE

## 2024-10-11 PROCEDURE — 85025 COMPLETE CBC W/AUTO DIFF WBC: CPT | Performed by: INTERNAL MEDICINE

## 2024-10-11 PROCEDURE — 86140 C-REACTIVE PROTEIN: CPT | Performed by: INTERNAL MEDICINE

## 2024-10-11 PROCEDURE — 25000003 PHARM REV CODE 250: Performed by: INTERNAL MEDICINE

## 2024-10-11 RX ORDER — METHADONE HYDROCHLORIDE 10 MG/1
10 TABLET ORAL EVERY 8 HOURS PRN
Status: DISCONTINUED | OUTPATIENT
Start: 2024-10-11 | End: 2024-10-12 | Stop reason: HOSPADM

## 2024-10-11 RX ORDER — HYDROCODONE BITARTRATE AND ACETAMINOPHEN 5; 325 MG/1; MG/1
2 TABLET ORAL EVERY 12 HOURS PRN
Status: DISCONTINUED | OUTPATIENT
Start: 2024-10-11 | End: 2024-10-12 | Stop reason: HOSPADM

## 2024-10-11 RX ORDER — METRONIDAZOLE 250 MG/1
500 TABLET ORAL EVERY 8 HOURS
Status: DISCONTINUED | OUTPATIENT
Start: 2024-10-11 | End: 2024-10-12 | Stop reason: HOSPADM

## 2024-10-11 RX ORDER — LEVOTHYROXINE SODIUM 25 UG/1
50 TABLET ORAL EVERY MORNING
Status: DISCONTINUED | OUTPATIENT
Start: 2024-10-11 | End: 2024-10-12 | Stop reason: HOSPADM

## 2024-10-11 RX ADMIN — SENNOSIDES AND DOCUSATE SODIUM 2 TABLET: 8.6; 5 TABLET ORAL at 09:10

## 2024-10-11 RX ADMIN — METRONIDAZOLE 500 MG: 250 TABLET ORAL at 02:10

## 2024-10-11 RX ADMIN — METRONIDAZOLE 500 MG: 250 TABLET ORAL at 09:10

## 2024-10-11 RX ADMIN — FAMOTIDINE 20 MG: 10 INJECTION INTRAVENOUS at 12:10

## 2024-10-11 RX ADMIN — FAMOTIDINE 20 MG: 10 INJECTION INTRAVENOUS at 09:10

## 2024-10-11 RX ADMIN — METRONIDAZOLE 500 MG: 250 TABLET ORAL at 06:10

## 2024-10-11 RX ADMIN — CEFTRIAXONE SODIUM 2 G: 2 INJECTION, POWDER, FOR SOLUTION INTRAMUSCULAR; INTRAVENOUS at 03:10

## 2024-10-11 RX ADMIN — LEVOTHYROXINE SODIUM 50 MCG: 0.03 TABLET ORAL at 06:10

## 2024-10-11 RX ADMIN — HYDROCODONE BITARTRATE AND ACETAMINOPHEN 2 TABLET: 5; 325 TABLET ORAL at 08:10

## 2024-10-11 NOTE — PLAN OF CARE
Discharge Planning:     sent email to [facility] to request hospital F/U appointment.

## 2024-10-11 NOTE — ASSESSMENT & PLAN NOTE
Based on CT scan findings    US also shows some pericholecystic fluid    But no israel sign    Her exam is very unimpressive    Soft abdomen   No major tenderness anywhere   No rebound  No israel sign     LFTs normal   T bili normal   WBC normal       PLAN    - let Surgery see her and see what they think  - keep NPO tonight  - continue IV Abx   Rocephin 2 grams IV and Flagyl 500 mg IV Q8  - run IVFs  - Control Pain  IV Dilaudid PRN   - cont her home Methadone PO PRN     - pepcid IV BID  - check cbc w diff in am , CMP , mag, CRP , A1C   - consult surgery

## 2024-10-11 NOTE — PLAN OF CARE
[facility]  Initial Discharge Assessment    Assessment completed at bedside with Pt, and all information on FaceSheet confirmed, including demographics, PCP. Pt has not addressed advance directives. She currently lives with her spouse in [place]. She currently does not have a PCP and last appointment was two months ago. She denies any DME/HH/HD/Blood Thinners. Devon Leonardo (Significant other) [phone] (Mobile) will transport her back home at discharge. She denies any recent hospitalizations. Plan for discharge is to return home. Case Management to continue to follow for discharge planning needs.        Primary Care Provider: No, Primary Doctor    Admission Diagnosis: Abdominal pain [R10.9]    Admission Date: 10/10/2024  Expected Discharge Date: 10/12/2024    Transition of Care Barriers: None    Payor: MEDICAID / Plan: [employer] / Product Type: Managed Medicaid /     Extended Emergency Contact Information  Primary Emergency Contact: Devon Leonardo  Mobile Phone: [phone]  Relation: Significant other   needed? No    Discharge Plan A: Home with family  Discharge Plan B: Home      [email or web address] [id number] [address] AT [facility] & [address] [address]  Phone: [phone] Fax: [phone]      Initial Assessment (most recent)       Adult Discharge Assessment - 10/11/24 1545          Discharge Assessment    Assessment Type Discharge Planning Assessment     Confirmed/corrected address, phone number and insurance Yes     Confirmed Demographics Correct on Facesheet     Source of Information patient     When was your last doctors appointment? --   two months ago    Does patient/caregiver understand observation status Yes     Reason For Admission Acute cholecystitis     People in Home spouse     Do you expect to return to your current living situation? Yes     Do you have help at home or someone to help you manage your care  at home? Yes     Who are your caregiver(s) and their phone number(s)? Devon Leonardo (Significant other)  [phone] (Mobile)     Prior to hospitilization cognitive status: Alert/Oriented     Current cognitive status: Alert/Oriented     Walking or Climbing Stairs Difficulty no     Dressing/Bathing Difficulty no     Home Accessibility not wheelchair accessible     Home Layout Able to live on 1st floor     Equipment Currently Used at Home none     Readmission within 30 days? No     Patient currently being followed by outpatient case management? No     Do you currently have service(s) that help you manage your care at home? No     Do you take prescription medications? Yes     Do you have prescription coverage? Yes     Coverage MEDICAID - [employer]     Do you have any problems affording any of your prescribed medications? No     Is the patient taking medications as prescribed? yes     Who is going to help you get home at discharge? Devon Leonardo (Significant other)  [phone] (Mobile)     How do you get to doctors appointments? car, drives self     Are you on dialysis? No     Do you take coumadin? No     Discharge Plan A Home with family     Discharge Plan B Home     DME Needed Upon Discharge  none     Discharge Plan discussed with: Patient     Transition of Care Barriers None        Physical Activity    On average, how many days per week do you engage in moderate to strenuous exercise (like a brisk walk)? 7 days     On average, how many minutes do you engage in exercise at this level? 120 min        Financial Resource Strain    How hard is it for you to pay for the very basics like food, housing, medical care, and heating? Not hard at all        Housing Stability    In the last 12 months, was there a time when you were not able to pay the mortgage or rent on time? No     At any time in the past 12 months, were you homeless or living in a shelter (including now)? No        Transportation Needs    Has the  lack of transportation kept you from medical appointments, meetings, work or from getting things needed for daily living? No        Food Insecurity    Within the past 12 months, you worried that your food would run out before you got the money to buy more. Never true     Within the past 12 months, the food you bought just didn't last and you didn't have money to get more. Never true        Stress    Do you feel stress - tense, restless, nervous, or anxious, or unable to sleep at night because your mind is troubled all the time - these days? Not at all        Social Isolation    How often do you feel lonely or isolated from those around you?  Never        Alcohol Use    Q1: How often do you have a drink containing alcohol? Never     Q2: How many drinks containing alcohol do you have on a typical day when you are drinking? Patient does not drink     Q3: How often do you have six or more drinks on one occasion? Never        Utilities    In the past 12 months has the electric, gas, oil, or water company threatened to shut off services in your home? No        Health Literacy    How often do you need to have someone help you when you read instructions, pamphlets, or other written material from your doctor or pharmacy? Never

## 2024-10-11 NOTE — ED PROVIDER NOTES
Encounter Date: 10/10/2024       History     Chief Complaint   Patient presents with    Abdominal Pain     Started yesterday, sharp pain throughout entire abdomen. Not worse with eating, no N/V/D. Describes pain as when pressing on stomach after child birth delivery.     Dizziness     Got lightheaded this am when moving but hasn't since.      HPI    Leona Salgado is a 39 y.o. female with a past medical history of hypothyroidism, DJD, neuropathy, fibromyalgia, and carpal tunnel that presents emergency department for sharp abdominal pain that is throughout her entire abdomen.  Pain began yesterday.  Pain is not worse with eating.  States that she is currently on her cycle and it was slightly lighter yesterday than what he is used to, but feels that it is heavier again today which was what she is used to.  Not complaining of any fever.  Is tolerating p.o..  Denies nausea, vomiting, diarrhea, constipation, chest pain, and shortness of breath.    Review of patient's allergies indicates:   Allergen Reactions    Poppyseed oil Hives    Shark cartilage Hives     No past medical history on file.  No past surgical history on file.  No family history on file.  Social History     Tobacco Use    Smoking status: Never    Smokeless tobacco: Never     Review of Systems   Constitutional:  Negative for fever.   HENT:  Negative for sore throat.    Respiratory:  Negative for cough and shortness of breath.    Cardiovascular:  Negative for chest pain and leg swelling.   Gastrointestinal:  Positive for abdominal pain. Negative for constipation, diarrhea, nausea and vomiting.   Genitourinary:  Negative for dysuria, frequency and hematuria.   Musculoskeletal:  Negative for back pain.   Skin:  Negative for rash.   Neurological:  Negative for weakness.   Hematological:  Does not bruise/bleed easily.       Physical Exam     Initial Vitals [10/10/24 1351]   BP Pulse Resp Temp SpO2   (!) 153/67 77 20 98.4 °F (36.9 °C) 99 %      MAP       --          Physical Exam    Nursing note and vitals reviewed.  Constitutional: She appears well-developed and well-nourished.   HENT:   Head: Normocephalic and atraumatic.   Eyes: EOM are normal. Pupils are equal, round, and reactive to light.   Neck:   Normal range of motion.  Cardiovascular:  Normal rate, regular rhythm and normal heart sounds.           Pulmonary/Chest: Breath sounds normal. No respiratory distress. She has no wheezes. She has no rhonchi. She has no rales.   Abdominal: Abdomen is soft. She exhibits no distension. There is abdominal tenderness (Generalized abdominal tenderness that is worse on the left). There is no rebound.   Musculoskeletal:         General: Normal range of motion.      Cervical back: Normal range of motion.     Neurological: She is alert and oriented to person, place, and time. She has normal strength. No cranial nerve deficit or sensory deficit. GCS score is 15. GCS eye subscore is 4. GCS verbal subscore is 5. GCS motor subscore is 6.   Skin: Capillary refill takes less than 2 seconds. No rash noted.   Psychiatric: She has a normal mood and affect. Thought content normal.         ED Course   Procedures  Labs Reviewed   CBC W/ AUTO DIFFERENTIAL - Abnormal       Result Value    WBC 12.17      RBC 4.13      Hemoglobin 12.5      Hematocrit 37.4      MCV 91      MCH 30.3      MCHC 33.4      RDW 13.7      Platelets 324      MPV 10.4      Immature Granulocytes 0.3      Gran # (ANC) 9.8 (*)     Immature Grans (Abs) 0.04      Lymph # 1.4      Mono # 0.7      Eos # 0.1      Baso # 0.02      nRBC 0      Gran % 80.5 (*)     Lymph % 11.8 (*)     Mono % 6.0      Eosinophil % 1.2      Basophil % 0.2      Differential Method Automated     COMPREHENSIVE METABOLIC PANEL - Abnormal    Sodium 137      Potassium 3.9      Chloride 101      CO2 29      Glucose 94      BUN 10      Creatinine 0.7      Calcium 9.2      Total Protein 8.0      Albumin 4.3      Total Bilirubin 0.8      Alkaline Phosphatase 94       AST 43 (*)     ALT 35      eGFR >60.0      Anion Gap 7 (*)    URINALYSIS, REFLEX TO URINE CULTURE - Abnormal    Specimen UA Urine, Clean Catch      Color, UA Yellow      Appearance, UA Hazy (*)     pH, UA 6.0      Specific Gravity, UA 1.025      Protein, UA Trace (*)     Glucose, UA Negative      Ketones, UA Negative      Bilirubin (UA) Negative      Occult Blood UA 3+ (*)     Nitrite, UA Negative      Urobilinogen, UA 2.0-3.0 (*)     Leukocytes, UA Negative      Narrative:     Specimen Source->Urine   URINALYSIS MICROSCOPIC - Abnormal    RBC, UA 10 (*)     WBC, UA 1      Bacteria Rare      Squam Epithel, UA 3      Microscopic Comment SEE COMMENT      Narrative:     Specimen Source->Urine   LIPASE    Lipase 8     POCT URINE PREGNANCY    POC Preg Test, Ur Negative       Acceptable Yes            Imaging Results              US Abdomen Limited (Final result)  Result time 10/10/24 21:04:47      Final result by Kamran Marie MD (10/10/24 21:04:47)                   Impression:      Cholelithiasis and possible pericholecystic fluid.  No sonographic Peguero sign.  Consider surgical evaluation or follow-up if the patient has symptoms of cholecystitis, particularly with the findings identified on prior CT.    Hepatic steatosis.  Hepatomegaly better evaluated on prior CT.      Electronically signed by: Kamran Marie MD  Date:    10/10/2024  Time:    21:04               Narrative:    EXAMINATION:  US ABDOMEN LIMITED    CLINICAL HISTORY:  Cholecystitis;.  Right upper quadrant pain.    TECHNIQUE:  Limited ultrasound of the right upper quadrant of the abdomen including pancreas, liver, gallbladder, common bile duct was performed.    COMPARISON:  CT, 10/10/2024.    FINDINGS:  Liver: Probable hepatic steatosis.  No focal hepatic lesions.    Gallbladder: Several prominent gallstones in the gallbladder, measuring up to 1.2 cm in size.  No overt gallbladder wall thickening, though there is possible mild  pericholecystic fluid.  Sonographic Peguero sign is reportedly negative.  No definite gallbladder wall hypervascularity.    Biliary system: The common duct is not dilated, measuring 5 mm.  No intrahepatic ductal dilatation.    Pancreas: Pancreas is unremarkable.  There is probable fatty atrophy of the pancreas.    Miscellaneous: No ascites. Limited evaluation of the right kidney is negative for hydronephrosis.  Right kidney measures 10.9 cm in length.                                        CT Abdomen Pelvis With IV Contrast NO Oral Contrast (Final result)  Result time 10/10/24 19:00:18      Final result by Delmy Crawford MD (10/10/24 19:00:18)                   Impression:      Acute cholecystitis.    Cystitis.    Notified ordering physician via [employer] secure chat at the time dictation.    This report was flagged in Epic as abnormal.      Electronically signed by: Delmy Crawford  Date:    10/10/2024  Time:    19:00               Narrative:    EXAMINATION:  CT ABDOMEN PELVIS WITH IV CONTRAST    CLINICAL HISTORY:  LLQ abdominal pain;    TECHNIQUE:  Low dose axial images, sagittal and coronal reformations were obtained from the lung bases to the pubic symphysis following the IV administration of 100 mL of Omnipaque 350 .  Oral contrast was not administered.    COMPARISON:  None.    FINDINGS:  Abdomen:    - Lower thorax:No acute findings.    - Lung bases: No acute findings    - Liver: Enlarged and fatty liver.    - Gallbladder: Cholelithiasis with gallbladder wall thickening and adjacent edema.    - Bile Ducts: No evidence of intra or extra hepatic biliary ductal dilation.    - Spleen: Negative.    - Kidneys: No mass or hydronephrosis.    - Adrenals: Unremarkable.    - Pancreas: No mass or peripancreatic fat stranding.    - Retroperitoneum:  No significant adenopathy.    - Vascular: No abdominal aortic aneurysm.    - Abdominal wall:  Unremarkable.    Pelvis:    Diffuse circumferential bladder wall thickening.   54 x 44 mm right adnexal cyst.    Bowel/Mesentery:    Large volume stool throughout the colon.  No small bowel obstruction.    Bones:  No acute osseous abnormality and no suspicious lytic or blastic lesion.                                       Medications   ketorolac injection 15 mg (15 mg Intravenous Given 10/10/24 1759)   iohexoL (OMNIPAQUE 350) injection 100 mL (100 mLs Intravenous Given 10/10/24 1824)   ciprofloxacin HCl tablet 500 mg (500 mg Oral Given 10/10/24 2152)   metroNIDAZOLE tablet 500 mg (500 mg Oral Given 10/10/24 2152)     Medical Decision Making  Leona Salgado is a 39 y.o. female with a past medical history of hypothyroidism, DJD, neuropathy, fibromyalgia, and carpal tunnel that presents emergency department for sharp abdominal pain that is throughout her entire abdomen.  Vitals stable.  Nontoxic female.  Generalized abdominal tenderness.  Does have right upper quadrant tenderness but no Peguero's sign.  Differential includes but isn't limited to diverticulitis, colitis, cholecystitis, biliary colic, pancreatitis, UTI, and musculoskeletal pain.  UA does not show evidence of infection.  Does have 3+ occult blood.  On her cycle currently.  UPT is negative.  No ectopic in his case.  CBC without significant leukocytosis and hemoglobin at baseline.  No anemia.  CMP without significant electrolyte abnormality.  LFTs only with mildly elevated AST of 43 but otherwise unremarkable.  Lipase was negative.  CT of the abdomen is concerning for cholecystitis.  Also concerning for cystitis.  Follow up ultrasound shows cholelithiasis with possible pericholecystic fluid.  Discussed with General surgery who will evaluate the patient in the morning for possible cholecystectomy.  Admitting to Hospital Medicine.    Amount and/or Complexity of Data Reviewed  Radiology: ordered.    Risk  Prescription drug management.  Decision regarding hospitalization.                                      Clinical Impression:  Final  diagnoses:  [R10.9] Abdominal pain          ED Disposition Condition    Admit Stable                Warren Garcia MD  10/10/24 2797

## 2024-10-11 NOTE — H&P
Critical access hospital Medicine  History & Physical    Patient Name: Leona Salgado  MRN: [id number]  Patient Class: OP- Observation  Admission Date: 10/10/2024  Attending Physician: Ward Bruce MD  Primary Care Provider: Maritza, Primary Doctor         Patient information was obtained from patient and ER records.     Subjective:     Principal Problem:Acute cholecystitis    Chief Complaint:   Chief Complaint   Patient presents with    Abdominal Pain     Started yesterday, sharp pain throughout entire abdomen. Not worse with eating, no N/V/D. Describes pain as when pressing on stomach after child birth delivery.     Dizziness     Got lightheaded this am when moving but hasn't since.         HPI: 39 WF with pmh of fibromyalgia ,  chronic back pain on Methadone and Hydrocodone PRN ,      Non smoker  Non drinker      She says the day prior around noon, this pain in abdomen started.   It was both sides but worse on LEFT side.     The pain was down low also on both sides of pelvis and if she held up her abdominal skin ,   the pain was better.        It hurt more to move.     No specific pain in RUQ    Pain is all over her abdomen and was worse she said but since getting IV Toradol and IV ABX in ER tonight she says its better      Never had this before she says       Did not travel or eat anything new  Nobody sick at home      No fevers no chills    No change in bowel habits     No nausea no vomiting        Actually she says she was eating like normal.    And was hungry and had no increase in pain with eating .            She finally called her doctors office and they said to go to the ER        In our ER      CT scan shows acute Cholecystitis findings     WBC was normal     Chemistry normal     LFTs normal   T bili normal       She was given IV Toradol and IV ABX in the ER      They spoke to the General Surgeon on call about her also         Plan was to admit her for acute  cholecystitis     No past medical  history on file.    No past surgical history on file.    Review of patient's allergies indicates:   Allergen Reactions    Poppyseed oil Hives    Shark cartilage Hives       No current facility-administered medications on file prior to encounter.     Current Outpatient Medications on File Prior to Encounter   Medication Sig    cholecalciferol, vitamin D3, 125 mcg (5,000 unit) Tab Take 1 tablet by mouth every morning.    HYDROcodone-acetaminophen (NORCO) 5-325 mg per tablet Take 2 tablets by mouth every 12 (twelve) hours as needed for Pain.    ibuprofen (ADVIL,MOTRIN) 800 MG tablet Take 800 mg by mouth every 8 (eight) hours as needed for Pain or Temperature greater than.    levothyroxine (SYNTHROID) 50 MCG tablet Take 50 mcg by mouth every morning.    methadone (DOLOPHINE) 10 MG tablet Take 30 mg by mouth every 8 (eight) hours.    [DISCONTINUED] diclofenac sodium (VOLTAREN) 1 % Gel Apply 4 g topically once daily.    [DISCONTINUED] LYRICA 50 mg capsule Take 1 capsule by mouth 2 (two) times daily.    [DISCONTINUED] amitriptyline (ELAVIL) 25 MG tablet 25 mg.    [DISCONTINUED] ashwagandha root extract 500 mg Cap Take 2 capsules by mouth.    [DISCONTINUED] buPROPion (WELLBUTRIN XL) 150 MG TB24 tablet 1 tablet in the morning Orally Once a day    [DISCONTINUED] DULoxetine (CYMBALTA) 30 MG capsule Take 30 mg by mouth.    [DISCONTINUED] ergocalciferol (ERGOCALCIFEROL) 50,000 unit Cap Take 1 capsule by mouth every 7 days.    [DISCONTINUED] gabapentin (NEURONTIN) 300 MG capsule Take 300 mg by mouth 4 (four) times daily. (Patient not taking: Reported on 10/11/2024)    [DISCONTINUED] multivitamin (THERAGRAN) per tablet Take 1 tablet by mouth once daily.    [DISCONTINUED] naloxone (NARCAN) 4 mg/actuation Spry      Family History    None       Tobacco Use    Smoking status: Never    Smokeless tobacco: Never   Substance and Sexual Activity    Alcohol use: Not on file    Drug use: Not on file    Sexual activity: Not on file     Review  of Systems   All other systems reviewed and are negative.    Objective:     Vital Signs (Most Recent):  Temp: 98.1 °F (36.7 °C) (10/10/24 2257)  Pulse: 70 (10/10/24 2257)  Resp: 20 (10/10/24 2257)  BP: (!) 120/56 (10/10/24 2257)  SpO2: 98 % (10/10/24 2257) Vital Signs (24h Range):  Temp:  [98.1 °F (36.7 °C)-98.4 °F (36.9 °C)] 98.1 °F (36.7 °C)  Pulse:  [70-77] 70  Resp:  [20] 20  SpO2:  [98 %-99 %] 98 %  BP: (120-153)/(56-67) 120/56     Weight: 116.6 kg (257 lb)  Body mass index is 47.01 kg/m².     Physical Exam  Vitals and nursing note reviewed.   Constitutional:       Appearance: Normal appearance.   HENT:      Head: Normocephalic.      Nose: Nose normal.      Mouth/Throat:      Mouth: Mucous membranes are moist.      Pharynx: Oropharynx is clear.   Eyes:      Conjunctiva/sclera: Conjunctivae normal.      Pupils: Pupils are equal, round, and reactive to light.   Cardiovascular:      Rate and Rhythm: Normal rate and regular rhythm.      Pulses: Normal pulses.      Heart sounds: Normal heart sounds.   Pulmonary:      Effort: Pulmonary effort is normal.      Breath sounds: Normal breath sounds.   Abdominal:      General: Abdomen is flat. Bowel sounds are normal.      Palpations: Abdomen is soft.   Musculoskeletal:         General: Normal range of motion.      Cervical back: Normal range of motion.   Skin:     General: Skin is warm.      Capillary Refill: Capillary refill takes less than 2 seconds.   Neurological:      General: No focal deficit present.      Mental Status: She is alert.   Psychiatric:         Mood and Affect: Mood normal.         Behavior: Behavior normal.              CRANIAL NERVES     CN III, IV, VI   Pupils are equal, round, and reactive to light.       Significant Labs: All pertinent labs within the past 24 hours have been reviewed.  CBC:   Recent Labs   Lab 10/10/24  1511   WBC 12.17   HGB 12.5   HCT 37.4        CMP:   Recent Labs   Lab 10/10/24  1511      K 3.9      CO2 29  "  GLU 94   BUN 10   CREATININE 0.7   CALCIUM 9.2   PROT 8.0   ALBUMIN 4.3   BILITOT 0.8   ALKPHOS 94   AST 43*   ALT 35   ANIONGAP 7*     Cardiac Markers: No results for input(s): "CKMB", "MYOGLOBIN", "BNP", "TROPISTAT" in the last 48 hours.  Coagulation: No results for input(s): "PT", "INR", "APTT" in the last 48 hours.  Lactic Acid: No results for input(s): "LACTATE" in the last 48 hours.  Lipid Panel: No results for input(s): "CHOL", "HDL", "LDLCALC", "TRIG", "CHOLHDL" in the last 48 hours.  Magnesium: No results for input(s): "MG" in the last 48 hours.  Troponin: No results for input(s): "TROPONINI", "TROPONINIHS" in the last 48 hours.  TSH: No results for input(s): "TSH" in the last 4320 hours.  Urine Studies:   Recent Labs   Lab 10/10/24  1534   COLORU Yellow   APPEARANCEUA Hazy*   PHUR 6.0   SPECGRAV 1.025   PROTEINUA Trace*   GLUCUA Negative   KETONESU Negative   BILIRUBINUA Negative   OCCULTUA 3+*   NITRITE Negative   UROBILINOGEN 2.0-3.0*   LEUKOCYTESUR Negative   RBCUA 10*   WBCUA 1   BACTERIA Rare   SQUAMEPITHEL 3       Significant Imaging: I have reviewed all pertinent imaging results/findings within the past 24 hours.  I have reviewed and interpreted all pertinent imaging results/findings within the past 24 hours.  Assessment/Plan:     * Acute cholecystitis    Based on CT scan findings    US also shows some pericholecystic fluid    But no israel sign    Her exam is very unimpressive    Soft abdomen   No major tenderness anywhere   No rebound  No israel sign     LFTs normal   T bili normal   WBC normal       PLAN    - let Surgery see her and see what they think  - keep NPO tonight  - continue IV Abx   Rocephin 2 grams IV and Flagyl 500 mg IV Q8  - run IVFs  - Control Pain  IV Dilaudid PRN   - cont her home Methadone PO PRN     - pepcid IV BID  - check cbc w diff in am , CMP , mag, CRP , A1C   - consult surgery     Abdominal pain    It appears to be the gall bladder  based on CT scan report       But her " exam is not consistent with cholecystitis   And her story also     Pain was more on left  And pain is all over abdomen     Her exam is very benign     No israel sign         PLAN    - keep NPO tonight  - have surgery look at her in am   - control pain and nausea   - run IVFs and check labs again in am       VTE Risk Mitigation (From admission, onward)           Ordered     Reason for No Pharmacological VTE Prophylaxis  Once        Question:  Reasons:  Answer:  Patient is Ambulatory    10/10/24 2205     IP VTE HIGH RISK PATIENT  Once         10/10/24 2205     Place sequential compression device  Until discontinued         10/10/24 2205                       On 10/11/2024, patient should be placed in hospital observation services under my care.             Ward Bruce MD  Department of Hospital Medicine  [facility]

## 2024-10-11 NOTE — CONSULTS
GENERAL SURGERY  OUTPATIENT H&P    REASON FOR VISIT/CC: Abdominal pain    HPI: Leona Salgado is a 39 y.o. female who presented with a 1 day history of severe sharp abdominal pain it was rather nonspecific. Not associated with eating, nausea or vomiting.  Subsequently went to the emergency room where she was found to have no significant lab abnormalities.  She underwent CT imaging which showed concerns for possible cholecystitis.  It was followed up with an ultrasound which showed gallstones, possible pericholecystic fluid but no wall thickening or Peguero sign.  It was admitted for suspicion for acute cholecystitis.  Started on antibiotics.    Patient was seen this morning.  Denies any right upper quadrant abdominal pain or epigastric pain.  Pain is mostly located in the left lower quadrant and lower pelvis. Actually much improved since admission. Has never had postprandial pain. No tenderness of the right upper abdomen.    I have reviewed the patient's chart including prior progress notes, procedures and testing.     ROS:   Review of Systems    PROBLEM LIST:  Patient Active Problem List   Diagnosis    Acute cholecystitis    Abdominal pain         HISTORY  No past medical history on file.    No past surgical history on file.    Social History     Tobacco Use    Smoking status: Never    Smokeless tobacco: Never       No family history on file.      MEDS:  No current facility-administered medications on file prior to encounter.     Current Outpatient Medications on File Prior to Encounter   Medication Sig Dispense Refill    cholecalciferol, vitamin D3, 125 mcg (5,000 unit) Tab Take 1 tablet by mouth every morning.      HYDROcodone-acetaminophen (NORCO) 5-325 mg per tablet Take 2 tablets by mouth every 12 (twelve) hours as needed for Pain.      ibuprofen (ADVIL,MOTRIN) 800 MG tablet Take 800 mg by mouth every 8 (eight) hours as needed for Pain or Temperature greater than.      levothyroxine (SYNTHROID) 50 MCG tablet  Take 50 mcg by mouth every morning.      methadone (DOLOPHINE) 10 MG tablet Take 30 mg by mouth every 8 (eight) hours.      [DISCONTINUED] diclofenac sodium (VOLTAREN) 1 % Gel Apply 4 g topically once daily.      [DISCONTINUED] LYRICA 50 mg capsule Take 1 capsule by mouth 2 (two) times daily.      [DISCONTINUED] amitriptyline (ELAVIL) 25 MG tablet 25 mg.      [DISCONTINUED] ashwagandha root extract 500 mg Cap Take 2 capsules by mouth.      [DISCONTINUED] buPROPion (WELLBUTRIN XL) 150 MG TB24 tablet 1 tablet in the morning Orally Once a day      [DISCONTINUED] DULoxetine (CYMBALTA) 30 MG capsule Take 30 mg by mouth.      [DISCONTINUED] ergocalciferol (ERGOCALCIFEROL) 50,000 unit Cap Take 1 capsule by mouth every 7 days.      [DISCONTINUED] gabapentin (NEURONTIN) 300 MG capsule Take 300 mg by mouth 4 (four) times daily. (Patient not taking: Reported on 10/11/2024)      [DISCONTINUED] multivitamin (THERAGRAN) per tablet Take 1 tablet by mouth once daily.      [DISCONTINUED] naloxone (NARCAN) 4 mg/actuation Spry          ALLERGIES:  Review of patient's allergies indicates:   Allergen Reactions    Poppyseed oil Hives    Shark cartilage Hives         VITALS:  Vitals:    10/11/24 1215   BP: 112/65   Pulse: 64   Resp: 18   Temp: 97.8 °F (36.6 °C)         PHYSICAL EXAM:  Physical Exam  Vitals reviewed.   Constitutional:       General: She is not in acute distress.     Appearance: Normal appearance. She is well-developed.   HENT:      Head: Normocephalic and atraumatic.      Nose: Nose normal.   Eyes:      General: No scleral icterus.     Conjunctiva/sclera: Conjunctivae normal.   Neck:      Trachea: No tracheal tenderness or tracheal deviation.   Cardiovascular:      Rate and Rhythm: Normal rate and regular rhythm.      Pulses: Normal pulses.   Pulmonary:      Effort: Pulmonary effort is normal. No accessory muscle usage or respiratory distress.      Breath sounds: Normal breath sounds.   Abdominal:      General: There is no  distension.      Palpations: Abdomen is soft.      Tenderness: There is no abdominal tenderness (overall no significant tenderness, specifically no tenderness in the right upper abdomen and negative Peguero's sign, very minimal discomfort in the left lower quadrant).   Musculoskeletal:         General: No swelling or tenderness. Normal range of motion.      Cervical back: Normal range of motion and neck supple. No rigidity.   Skin:     General: Skin is warm and dry.      Coloration: Skin is not jaundiced.      Findings: No erythema.   Neurological:      General: No focal deficit present.      Mental Status: She is alert and oriented to person, place, and time.      Motor: No weakness or abnormal muscle tone.   Psychiatric:         Mood and Affect: Mood normal.         Behavior: Behavior normal.         Thought Content: Thought content normal.         Judgment: Judgment normal.           LABS:  Lab Results   Component Value Date    WBC 9.79 10/11/2024    RBC 3.81 (L) 10/11/2024    HGB 11.3 (L) 10/11/2024    HCT 34.9 (L) 10/11/2024     10/11/2024     Lab Results   Component Value Date     10/11/2024     10/11/2024    K 4.1 10/11/2024     10/11/2024    CO2 31 (H) 10/11/2024    BUN 12 10/11/2024    CREATININE 0.8 10/11/2024    CALCIUM 8.5 (L) 10/11/2024     Lab Results   Component Value Date    ALT 35 10/11/2024    AST 41 (H) 10/11/2024    ALKPHOS 84 10/11/2024    BILITOT 0.4 10/11/2024     Lab Results   Component Value Date    MG 2.0 10/11/2024       STUDIES:  Images and reports were personally reviewed.  CT abdomen pelvis      Abdominal ultrasound  FINDINGS:  Liver: Probable hepatic steatosis.  No focal hepatic lesions.     Gallbladder: Several prominent gallstones in the gallbladder, measuring up to 1.2 cm in size.  No overt gallbladder wall thickening, though there is possible mild pericholecystic fluid.  Sonographic Peguero sign is reportedly negative.  No definite gallbladder wall  hypervascularity.     Biliary system: The common duct is not dilated, measuring 5 mm.  No intrahepatic ductal dilatation.     Pancreas: Pancreas is unremarkable.  There is probable fatty atrophy of the pancreas.     Miscellaneous: No ascites. Limited evaluation of the right kidney is negative for hydronephrosis.  Right kidney measures 10.9 cm in length.     Impression:     Cholelithiasis and possible pericholecystic fluid.  No sonographic Peguero sign.  Consider surgical evaluation or follow-up if the patient has symptoms of cholecystitis, particularly with the findings identified on prior CT.     Hepatic steatosis.  Hepatomegaly better evaluated on prior CT.    ASSESSMENT & PLAN:  39 y.o. female with abdominal pain, concern for cholecystitis  -despite imaging findings suggestive of cholecystitis the patient has no right upper quadrant or epigastric abdominal pain, no postprandial pain, and no nausea or vomiting  -I discussed with the patient the imaging findings that were concerning for cholecystitis though explained that her pain profile does not fit the typical symptoms for acute cholecystitis or biliary colic  -we will defer surgery at this time and allow for diet as tolerated, if tolerated this would reassured that she unlikely has acute cholecystitis and that her pain may be from some other etiology which is not readily apparent  -recommended continue Cipro and Flagyl for now, possible early diverticulitis?

## 2024-10-11 NOTE — HPI
39 WF with pmh of fibromyalgia ,  chronic back pain on Methadone and Hydrocodone PRN ,      Non smoker  Non drinker      She says the day prior around noon, this pain in abdomen started.   It was both sides but worse on LEFT side.     The pain was down low also on both sides of pelvis and if she held up her abdominal skin ,   the pain was better.        It hurt more to move.     No specific pain in RUQ    Pain is all over her abdomen and was worse she said but since getting IV Toradol and IV ABX in ER tonight she says its better      Never had this before she says       Did not travel or eat anything new  Nobody sick at home      No fevers no chills    No change in bowel habits     No nausea no vomiting        Actually she says she was eating like normal.    And was hungry and had no increase in pain with eating .            She finally called her doctors office and they said to go to the ER        In our ER      CT scan shows acute Cholecystitis findings     WBC was normal     Chemistry normal     LFTs normal   T bili normal       She was given IV Toradol and IV ABX in the ER      They spoke to the General Surgeon on call about her also         Plan was to admit her for acute  cholecystitis

## 2024-10-11 NOTE — CARE UPDATE
Patient admitted earlier with abdominal pain. CT abdomen/pelvis reveals acute cholecystitis. Pain is left sided. Surgery has reviewed, and does not feel this is acute cholecystitis. Recommends allowing patient to eat and monitor for worsening of pain with eating. Reevaluate tomorrow. VSS, CV rrr, lungs clear, abdomen tender in left mid area, no guarding or rebound tenderness.   Will allow patient to eat and monitor.

## 2024-10-11 NOTE — ASSESSMENT & PLAN NOTE
It appears to be the gall bladder  based on CT scan report       But her exam is not consistent with cholecystitis   And her story also     Pain was more on left  And pain is all over abdomen     Her exam is very benign     No israel sign         PLAN    - keep NPO tonight  - have surgery look at her in am   - control pain and nausea   - run IVFs and check labs again in am

## 2024-10-11 NOTE — SUBJECTIVE & OBJECTIVE
No past medical history on file.    No past surgical history on file.    Review of patient's allergies indicates:   Allergen Reactions    Poppyseed oil Hives    Shark cartilage Hives       No current facility-administered medications on file prior to encounter.     Current Outpatient Medications on File Prior to Encounter   Medication Sig    cholecalciferol, vitamin D3, 125 mcg (5,000 unit) Tab Take 1 tablet by mouth every morning.    HYDROcodone-acetaminophen (NORCO) 5-325 mg per tablet Take 2 tablets by mouth every 12 (twelve) hours as needed for Pain.    ibuprofen (ADVIL,MOTRIN) 800 MG tablet Take 800 mg by mouth every 8 (eight) hours as needed for Pain or Temperature greater than.    levothyroxine (SYNTHROID) 50 MCG tablet Take 50 mcg by mouth every morning.    methadone (DOLOPHINE) 10 MG tablet Take 30 mg by mouth every 8 (eight) hours.    [DISCONTINUED] diclofenac sodium (VOLTAREN) 1 % Gel Apply 4 g topically once daily.    [DISCONTINUED] LYRICA 50 mg capsule Take 1 capsule by mouth 2 (two) times daily.    [DISCONTINUED] amitriptyline (ELAVIL) 25 MG tablet 25 mg.    [DISCONTINUED] ashwagandha root extract 500 mg Cap Take 2 capsules by mouth.    [DISCONTINUED] buPROPion (WELLBUTRIN XL) 150 MG TB24 tablet 1 tablet in the morning Orally Once a day    [DISCONTINUED] DULoxetine (CYMBALTA) 30 MG capsule Take 30 mg by mouth.    [DISCONTINUED] ergocalciferol (ERGOCALCIFEROL) 50,000 unit Cap Take 1 capsule by mouth every 7 days.    [DISCONTINUED] gabapentin (NEURONTIN) 300 MG capsule Take 300 mg by mouth 4 (four) times daily. (Patient not taking: Reported on 10/11/2024)    [DISCONTINUED] multivitamin (THERAGRAN) per tablet Take 1 tablet by mouth once daily.    [DISCONTINUED] naloxone (NARCAN) 4 mg/actuation Spry      Family History    None       Tobacco Use    Smoking status: Never    Smokeless tobacco: Never   Substance and Sexual Activity    Alcohol use: Not on file    Drug use: Not on file    Sexual activity: Not on  file     Review of Systems   All other systems reviewed and are negative.    Objective:     Vital Signs (Most Recent):  Temp: 98.1 °F (36.7 °C) (10/10/24 2257)  Pulse: 70 (10/10/24 2257)  Resp: 20 (10/10/24 2257)  BP: (!) 120/56 (10/10/24 2257)  SpO2: 98 % (10/10/24 2257) Vital Signs (24h Range):  Temp:  [98.1 °F (36.7 °C)-98.4 °F (36.9 °C)] 98.1 °F (36.7 °C)  Pulse:  [70-77] 70  Resp:  [20] 20  SpO2:  [98 %-99 %] 98 %  BP: (120-153)/(56-67) 120/56     Weight: 116.6 kg (257 lb)  Body mass index is 47.01 kg/m².     Physical Exam  Vitals and nursing note reviewed.   Constitutional:       Appearance: Normal appearance.   HENT:      Head: Normocephalic.      Nose: Nose normal.      Mouth/Throat:      Mouth: Mucous membranes are moist.      Pharynx: Oropharynx is clear.   Eyes:      Conjunctiva/sclera: Conjunctivae normal.      Pupils: Pupils are equal, round, and reactive to light.   Cardiovascular:      Rate and Rhythm: Normal rate and regular rhythm.      Pulses: Normal pulses.      Heart sounds: Normal heart sounds.   Pulmonary:      Effort: Pulmonary effort is normal.      Breath sounds: Normal breath sounds.   Abdominal:      General: Abdomen is flat. Bowel sounds are normal.      Palpations: Abdomen is soft.   Musculoskeletal:         General: Normal range of motion.      Cervical back: Normal range of motion.   Skin:     General: Skin is warm.      Capillary Refill: Capillary refill takes less than 2 seconds.   Neurological:      General: No focal deficit present.      Mental Status: She is alert.   Psychiatric:         Mood and Affect: Mood normal.         Behavior: Behavior normal.              CRANIAL NERVES     CN III, IV, VI   Pupils are equal, round, and reactive to light.       Significant Labs: All pertinent labs within the past 24 hours have been reviewed.  CBC:   Recent Labs   Lab 10/10/24  1511   WBC 12.17   HGB 12.5   HCT 37.4        CMP:   Recent Labs   Lab 10/10/24  1511      K 3.9   CL  "101   CO2 29   GLU 94   BUN 10   CREATININE 0.7   CALCIUM 9.2   PROT 8.0   ALBUMIN 4.3   BILITOT 0.8   ALKPHOS 94   AST 43*   ALT 35   ANIONGAP 7*     Cardiac Markers: No results for input(s): "CKMB", "MYOGLOBIN", "BNP", "TROPISTAT" in the last 48 hours.  Coagulation: No results for input(s): "PT", "INR", "APTT" in the last 48 hours.  Lactic Acid: No results for input(s): "LACTATE" in the last 48 hours.  Lipid Panel: No results for input(s): "CHOL", "HDL", "LDLCALC", "TRIG", "CHOLHDL" in the last 48 hours.  Magnesium: No results for input(s): "MG" in the last 48 hours.  Troponin: No results for input(s): "TROPONINI", "TROPONINIHS" in the last 48 hours.  TSH: No results for input(s): "TSH" in the last 4320 hours.  Urine Studies:   Recent Labs   Lab 10/10/24  1534   COLORU Yellow   APPEARANCEUA Hazy*   PHUR 6.0   SPECGRAV 1.025   PROTEINUA Trace*   GLUCUA Negative   KETONESU Negative   BILIRUBINUA Negative   OCCULTUA 3+*   NITRITE Negative   UROBILINOGEN 2.0-3.0*   LEUKOCYTESUR Negative   RBCUA 10*   WBCUA 1   BACTERIA Rare   SQUAMEPITHEL 3       Significant Imaging: I have reviewed all pertinent imaging results/findings within the past 24 hours.  I have reviewed and interpreted all pertinent imaging results/findings within the past 24 hours.  "

## 2024-10-12 VITALS
TEMPERATURE: 98 F | SYSTOLIC BLOOD PRESSURE: 131 MMHG | RESPIRATION RATE: 18 BRPM | WEIGHT: 257 LBS | OXYGEN SATURATION: 96 % | BODY MASS INDEX: 47.29 KG/M2 | DIASTOLIC BLOOD PRESSURE: 65 MMHG | HEART RATE: 68 BPM | HEIGHT: 62 IN

## 2024-10-12 LAB
ALBUMIN SERPL BCP-MCNC: 4.1 G/DL (ref 3.5–5.2)
ALP SERPL-CCNC: 95 U/L (ref 55–135)
ALT SERPL W/O P-5'-P-CCNC: 41 U/L (ref 10–44)
ANION GAP SERPL CALC-SCNC: 6 MMOL/L (ref 8–16)
AST SERPL-CCNC: 44 U/L (ref 10–40)
BASOPHILS # BLD AUTO: 0.03 K/UL (ref 0–0.2)
BASOPHILS NFR BLD: 0.3 % (ref 0–1.9)
BILIRUB SERPL-MCNC: 0.3 MG/DL (ref 0.1–1)
BUN SERPL-MCNC: 10 MG/DL (ref 6–20)
CALCIUM SERPL-MCNC: 8.9 MG/DL (ref 8.7–10.5)
CHLORIDE SERPL-SCNC: 105 MMOL/L (ref 95–110)
CO2 SERPL-SCNC: 28 MMOL/L (ref 23–29)
CREAT SERPL-MCNC: 0.7 MG/DL (ref 0.5–1.4)
DIFFERENTIAL METHOD BLD: ABNORMAL
EOSINOPHIL # BLD AUTO: 0.2 K/UL (ref 0–0.5)
EOSINOPHIL NFR BLD: 2.3 % (ref 0–8)
ERYTHROCYTE [DISTWIDTH] IN BLOOD BY AUTOMATED COUNT: 13.7 % (ref 11.5–14.5)
EST. GFR  (NO RACE VARIABLE): >60 ML/MIN/1.73 M^2
GLUCOSE SERPL-MCNC: 108 MG/DL (ref 70–110)
HCT VFR BLD AUTO: 37.7 % (ref 37–48.5)
HGB BLD-MCNC: 12.2 G/DL (ref 12–16)
IMM GRANULOCYTES # BLD AUTO: 0.02 K/UL (ref 0–0.04)
IMM GRANULOCYTES NFR BLD AUTO: 0.2 % (ref 0–0.5)
LYMPHOCYTES # BLD AUTO: 1.5 K/UL (ref 1–4.8)
LYMPHOCYTES NFR BLD: 16.4 % (ref 18–48)
MAGNESIUM SERPL-MCNC: 2.1 MG/DL (ref 1.6–2.6)
MCH RBC QN AUTO: 29.9 PG (ref 27–31)
MCHC RBC AUTO-ENTMCNC: 32.4 G/DL (ref 32–36)
MCV RBC AUTO: 92 FL (ref 82–98)
MONOCYTES # BLD AUTO: 0.5 K/UL (ref 0.3–1)
MONOCYTES NFR BLD: 4.9 % (ref 4–15)
NEUTROPHILS # BLD AUTO: 7.1 K/UL (ref 1.8–7.7)
NEUTROPHILS NFR BLD: 75.9 % (ref 38–73)
NRBC BLD-RTO: 0 /100 WBC
PLATELET # BLD AUTO: 299 K/UL (ref 150–450)
PMV BLD AUTO: 10.8 FL (ref 9.2–12.9)
POTASSIUM SERPL-SCNC: 4.6 MMOL/L (ref 3.5–5.1)
PROT SERPL-MCNC: 7.7 G/DL (ref 6–8.4)
RBC # BLD AUTO: 4.08 M/UL (ref 4–5.4)
SODIUM SERPL-SCNC: 139 MMOL/L (ref 136–145)
WBC # BLD AUTO: 9.4 K/UL (ref 3.9–12.7)

## 2024-10-12 PROCEDURE — 83735 ASSAY OF MAGNESIUM: CPT | Performed by: INTERNAL MEDICINE

## 2024-10-12 PROCEDURE — 85025 COMPLETE CBC W/AUTO DIFF WBC: CPT | Performed by: INTERNAL MEDICINE

## 2024-10-12 PROCEDURE — 96375 TX/PRO/DX INJ NEW DRUG ADDON: CPT

## 2024-10-12 PROCEDURE — G0378 HOSPITAL OBSERVATION PER HR: HCPCS

## 2024-10-12 PROCEDURE — 36415 COLL VENOUS BLD VENIPUNCTURE: CPT | Performed by: INTERNAL MEDICINE

## 2024-10-12 PROCEDURE — 96366 THER/PROPH/DIAG IV INF ADDON: CPT

## 2024-10-12 PROCEDURE — 63600175 PHARM REV CODE 636 W HCPCS: Performed by: INTERNAL MEDICINE

## 2024-10-12 PROCEDURE — 25000003 PHARM REV CODE 250: Performed by: INTERNAL MEDICINE

## 2024-10-12 PROCEDURE — 96376 TX/PRO/DX INJ SAME DRUG ADON: CPT

## 2024-10-12 PROCEDURE — 80053 COMPREHEN METABOLIC PANEL: CPT | Performed by: INTERNAL MEDICINE

## 2024-10-12 RX ORDER — CIPROFLOXACIN 500 MG/1
500 TABLET ORAL EVERY 12 HOURS
Qty: 14 TABLET | Refills: 0 | Status: SHIPPED | OUTPATIENT
Start: 2024-10-12

## 2024-10-12 RX ORDER — METRONIDAZOLE 500 MG/1
500 TABLET ORAL EVERY 12 HOURS
Qty: 14 TABLET | Refills: 0 | Status: SHIPPED | OUTPATIENT
Start: 2024-10-12

## 2024-10-12 RX ADMIN — METRONIDAZOLE 500 MG: 250 TABLET ORAL at 06:10

## 2024-10-12 RX ADMIN — METRONIDAZOLE 500 MG: 250 TABLET ORAL at 01:10

## 2024-10-12 RX ADMIN — ONDANSETRON 4 MG: 2 INJECTION INTRAMUSCULAR; INTRAVENOUS at 09:10

## 2024-10-12 RX ADMIN — CEFTRIAXONE SODIUM 2 G: 2 INJECTION, POWDER, FOR SOLUTION INTRAMUSCULAR; INTRAVENOUS at 03:10

## 2024-10-12 RX ADMIN — SENNOSIDES AND DOCUSATE SODIUM 2 TABLET: 8.6; 5 TABLET ORAL at 08:10

## 2024-10-12 RX ADMIN — LEVOTHYROXINE SODIUM 50 MCG: 0.03 TABLET ORAL at 06:10

## 2024-10-12 RX ADMIN — FAMOTIDINE 20 MG: 10 INJECTION INTRAVENOUS at 08:10

## 2024-10-12 NOTE — PROGRESS NOTES
[facility] Medicine  Progress Note    Patient Name: Leona Salgado  MRN: [id number]  Patient Class: OP- Observation   Admission Date: 10/10/2024  Length of Stay: 0 days  Attending Physician: Waldo Biggs MD  Primary Care Provider: Maritza, Primary Doctor        Subjective:     Principal Problem:Acute cholecystitis        HPI:  39 WF with pmh of fibromyalgia ,  chronic back pain on Methadone and Hydrocodone PRN ,      Non smoker  Non drinker      She says the day prior around noon, this pain in abdomen started.   It was both sides but worse on LEFT side.     The pain was down low also on both sides of pelvis and if she held up her abdominal skin ,   the pain was better.        It hurt more to move.     No specific pain in RUQ    Pain is all over her abdomen and was worse she said but since getting IV Toradol and IV ABX in ER tonight she says its better      Never had this before she says       Did not travel or eat anything new  Nobody sick at home      No fevers no chills    No change in bowel habits     No nausea no vomiting        Actually she says she was eating like normal.    And was hungry and had no increase in pain with eating .            She finally called her doctors office and they said to go to the ER        In our ER      CT scan shows acute Cholecystitis findings     WBC was normal     Chemistry normal     LFTs normal   T bili normal       She was given IV Toradol and IV ABX in the ER      They spoke to the General Surgeon on call about her also         Plan was to admit her for acute  cholecystitis     Overview/Hospital Course:  No notes on file    Interval History:     Patient seen and examined  She ate last night normal male  Still having mild lower abdominal pain at the left side.  But no epigastric or right hypochondrial pain at all.  CT scan and surgeon review noted .      Review of Systems  Objective:     Vital Signs (Most Recent):  Temp: 97.9 °F (36.6 °C) (10/12/24  1100)  Pulse: 68 (10/12/24 1100)  Resp: 18 (10/12/24 1100)  BP: 131/65 (10/12/24 1100)  SpO2: 96 % (10/12/24 1100) Vital Signs (24h Range):  Temp:  [97.5 °F (36.4 °C)-98.4 °F (36.9 °C)] 97.9 °F (36.6 °C)  Pulse:  [58-71] 68  Resp:  [15-20] 18  SpO2:  [94 %-98 %] 96 %  BP: (100-131)/(50-70) 131/65     Weight: 116.6 kg (257 lb)  Body mass index is 47.01 kg/m².    Intake/Output Summary (Last 24 hours) at 10/12/2024 1256  Last data filed at 10/12/2024 0440  Gross per 24 hour   Intake 1057 ml   Output --   Net 1057 ml         Physical Exam  Vitals and nursing note reviewed.   HENT:      Head: Normocephalic and atraumatic.   Eyes:      Conjunctiva/sclera: Conjunctivae normal.   Neck:      Vascular: No JVD.   Cardiovascular:      Rate and Rhythm: Normal rate.      Heart sounds: Normal heart sounds.   Pulmonary:      Effort: Pulmonary effort is normal.      Breath sounds: Normal breath sounds.   Abdominal:      Palpations: Abdomen is soft.      Tenderness: There is abdominal tenderness.      Comments:  On left lower side    Skin:     General: Skin is warm.   Neurological:      Mental Status: She is alert and oriented to person, place, and time.   Psychiatric:         Mood and Affect: Mood normal.             Significant Labs: All pertinent labs within the past 24 hours have been reviewed.  BMP:   Recent Labs   Lab 10/12/24  1029         K 4.6      CO2 28   BUN 10   CREATININE 0.7   CALCIUM 8.9   MG 2.1     CBC:   Recent Labs   Lab 10/10/24  1511 10/11/24  0440 10/12/24  1029   WBC 12.17 9.79 9.40   HGB 12.5 11.3* 12.2   HCT 37.4 34.9* 37.7    284 299       Significant Imaging: I have reviewed all pertinent imaging results/findings within the past 24 hours.    Assessment/Plan:      * Acute cholecystitis  Less likely   Continue antibiotics possible for UTI too  Possible DC today     Abdominal pain  The discrepancy with the CTA ultrasound finding with clinical exam  Pain is on the left lower abdominal  area possible colitis  No pain at all in the gallbladder area and this is not cholecystitis  CT scan with bladder thickening possible UTI cystitis giving pain   Advance diet and if she tolerate possible discharge  We will wait for Surgeon reviewed prior to discharge  UC will not productive after antibiotics     VTE Risk Mitigation (From admission, onward)           Ordered     Reason for No Pharmacological VTE Prophylaxis  Once        Question:  Reasons:  Answer:  Patient is Ambulatory    10/10/24 2205     IP VTE HIGH RISK PATIENT  Once         10/10/24 2205     Place sequential compression device  Until discontinued         10/10/24 2205                    Discharge Planning   ELINA: 10/12/2024     Code Status: Full Code   Is the patient medically ready for discharge?:     Reason for patient still in hospital (select all that apply): Treatment  Discharge Plan A: Home with family                  Waldo Biggs MD  Department of Hospital Medicine   [facility]

## 2024-10-12 NOTE — DISCHARGE SUMMARY
[facility] Medicine  Discharge Summary      Patient Name: Leona Salgado  MRN: [id number]  GRACE: [id number]  Patient Class: OP- Observation  Admission Date: 10/10/2024  Hospital Length of Stay: 0 days  Discharge Date and Time:  10/12/2024 1:59 PM  Attending Physician: Waldo Biggs MD   Discharging Provider: Waldo Biggs MD  Primary Care Provider: Maritza, Primary Doctor    Primary Care Team: Networked reference to record PCT     HPI:   39 WF with pmh of fibromyalgia ,  chronic back pain on Methadone and Hydrocodone PRN ,      Non smoker  Non drinker      She says the day prior around noon, this pain in abdomen started.   It was both sides but worse on LEFT side.     The pain was down low also on both sides of pelvis and if she held up her abdominal skin ,   the pain was better.        It hurt more to move.     No specific pain in RUQ    Pain is all over her abdomen and was worse she said but since getting IV Toradol and IV ABX in ER tonight she says its better      Never had this before she says       Did not travel or eat anything new  Nobody sick at home      No fevers no chills    No change in bowel habits     No nausea no vomiting        Actually she says she was eating like normal.    And was hungry and had no increase in pain with eating .            She finally called her doctors office and they said to go to the ER        In our ER      CT scan shows acute Cholecystitis findings     WBC was normal     Chemistry normal     LFTs normal   T bili normal       She was given IV Toradol and IV ABX in the ER      They spoke to the General Surgeon on call about her also         Plan was to admit her for acute  cholecystitis     Procedure(s) (LRB):  CHOLECYSTECTOMY, LAPAROSCOPIC (N/A)      Hospital Course:    39 y.o. F was admitted with possible cholecystitis.  CT scan of the abdomen was done and found to have gallstone with possible signs of cholecystitis but there is no pain whatsoever in the  right upper side of the abdomen .  Her pain  is mild and left lower side possible colitis and antibiotics started.  Later patient tolerated well to the diet and no problem and discharged in stable condition.  Surgeon reviewed the patient and he also does not think this is cholecystitis.  Later patient was discharged in stable condition with antibiotics and follow up with the surgeon if needed as outpatient     Goals of Care Treatment Preferences:  Code Status: Full Code      SDOH Screening:  The patient was screened for utility difficulties, food insecurity, transport difficulties, housing insecurity, and interpersonal safety and there were no concerns identified this admission.     Consults:   Consults (From admission, onward)          Status Ordering Provider     Inpatient consult to Social Work/Case Management  Once        Provider:  (Not yet assigned)    Completed MEHRDAD CAREY     Inpatient consult to General Surgery  Once        Provider:  Forest Santizo Jr., MD    Completed AGUSTIN DIAZ            No new Assessment & Plan notes have been filed under this hospital service since the last note was generated.  Service: Hospital Medicine    Final Active Diagnoses:    Diagnosis Date Noted POA    PRINCIPAL PROBLEM:  Acute cholecystitis [K81.0] 10/11/2024 Yes    Abdominal pain [R10.9] 10/11/2024 Yes      Problems Resolved During this Admission:       Discharged Condition: good    Disposition: Home or Self Care    Follow Up:   Follow-up Information       [facility]. Call in 1 week(s).    Why: Please call [facility] to schedule hospital F/U appointment.  Contact information:  [facility] [address]  [phone]               Forest Santizo Jr., MD. Schedule an appointment as soon as possible for a visit in 1 week(s).    Specialty: General Surgery  Contact information:  [address]  [address]  [facility] [address]  [phone]                            Patient Instructions:      Diet Cardiac     Activity as tolerated       Significant Diagnostic Studies: Labs: CMP   Recent Labs   Lab 10/10/24  1511 10/11/24  0440 10/12/24  1029    139 139   K 3.9 4.1 4.6    103 105   CO2 29 31* 28   GLU 94 110 108   BUN 10 12 10   CREATININE 0.7 0.8 0.7   CALCIUM 9.2 8.5* 8.9   PROT 8.0 7.3 7.7   ALBUMIN 4.3 3.8 4.1   BILITOT 0.8 0.4 0.3   ALKPHOS 94 84 95   AST 43* 41* 44*   ALT 35 35 41   ANIONGAP 7* 5* 6*    and CBC   Recent Labs   Lab 10/10/24  1511 10/11/24  0440 10/12/24  1029   WBC 12.17 9.79 9.40   HGB 12.5 11.3* 12.2   HCT 37.4 34.9* 37.7    284 299       Pending Diagnostic Studies:       None           Medications:  Reconciled Home Medications:      Medication List        START taking these medications      ciprofloxacin HCl 500 MG tablet  Commonly known as: CIPRO  Take 1 tablet (500 mg total) by mouth every 12 (twelve) hours.     metroNIDAZOLE 500 MG tablet  Commonly known as: FLAGYL  Take 1 tablet (500 mg total) by mouth every 12 (twelve) hours.            CONTINUE taking these medications      cholecalciferol (vitamin D3) 125 mcg (5,000 unit) Tab  Take 1 tablet by mouth every morning.     HYDROcodone-acetaminophen 5-325 mg per tablet  Commonly known as: NORCO  Take 2 tablets by mouth every 12 (twelve) hours as needed for Pain.     levothyroxine 50 MCG tablet  Commonly known as: SYNTHROID  Take 50 mcg by mouth every morning.     methadone 10 MG tablet  Commonly known as: DOLOPHINE  Take 30 mg by mouth every 8 (eight) hours.            STOP taking these medications      ibuprofen 800 MG tablet  Commonly known as: ADVIL,MOTRIN              Indwelling Lines/Drains at time of discharge:   Lines/Drains/Airways       None                 General: Patient resting comfortably in no acute distress. Appears as stated age. Calm  Eyes: EOM intact. No conjunctivae injection. No scleral icterus.  ENT: Hearing grossly intact. No discharge from ears. No  nasal discharge.   CVS: RRR. No LE edema BL.  Lungs: CTA BL, no wheezing or crackles. Good breath sounds. No accessory muscle use. No acute respiratory distress  Neuro: non focal , Follows commands. Responds appropriately   Time spent on the discharge of patient: 22 minutes         Waldo Biggs MD  Department of Hospital Medicine  [facility]

## 2024-10-12 NOTE — ASSESSMENT & PLAN NOTE
The discrepancy with the CTA ultrasound finding with clinical exam  Pain is on the left lower abdominal area possible colitis  No pain at all in the gallbladder area and this is not cholecystitis  CT scan with bladder thickening possible UTI cystitis giving pain   Advance diet and if she tolerate possible discharge  We will wait for Surgeon reviewed prior to discharge

## 2024-10-12 NOTE — HOSPITAL COURSE
39 y.o. F was admitted with possible cholecystitis.  CT scan of the abdomen was done and found to have gallstone with possible signs of cholecystitis but there is no pain whatsoever in the right upper side of the abdomen .  Her pain  is mild and left lower side possible colitis and antibiotics started.  Later patient tolerated well to the diet and no problem and discharged in stable condition.  Surgeon reviewed the patient and he also does not think this is cholecystitis.  Later patient was discharged in stable condition with antibiotics and follow up with the surgeon if needed as outpatient

## 2024-10-12 NOTE — PLAN OF CARE
Patient cleared for discharge from case management standpoint.       10/12/24 8586   Final Note   Assessment Type Final Discharge Note   Anticipated Discharge Disposition Home   Hospital Resources/Appts/Education Provided Provided patient/caregiver with written discharge plan information;Provided education on problems/symptoms using teachback;Appointments scheduled and added to AVS   Post-Acute Status   Post-Acute Authorization Other   Other Status No Post-Acute Service Needs   Discharge Delays None known at this time

## 2024-10-12 NOTE — NURSING
10/11/24 2234:pt aao. Pt ambulated to and from snack machine for snack. No further c/o pain noted. No c/o sob was noted. Aao. Respirations were even and unlabored. Fall precautions in place. Bed low, locked. Call light was in place. Pt instructed to call for assistance. NAD was noted further.

## 2024-10-12 NOTE — SUBJECTIVE & OBJECTIVE
Interval History:     Patient seen and examined  She ate last night normal male  Still having mild lower abdominal pain at the left side.  But no epigastric or right hypochondrial pain at all.  CT scan and surgeon review noted .      Review of Systems  Objective:     Vital Signs (Most Recent):  Temp: 97.9 °F (36.6 °C) (10/12/24 1100)  Pulse: 68 (10/12/24 1100)  Resp: 18 (10/12/24 1100)  BP: 131/65 (10/12/24 1100)  SpO2: 96 % (10/12/24 1100) Vital Signs (24h Range):  Temp:  [97.5 °F (36.4 °C)-98.4 °F (36.9 °C)] 97.9 °F (36.6 °C)  Pulse:  [58-71] 68  Resp:  [15-20] 18  SpO2:  [94 %-98 %] 96 %  BP: (100-131)/(50-70) 131/65     Weight: 116.6 kg (257 lb)  Body mass index is 47.01 kg/m².    Intake/Output Summary (Last 24 hours) at 10/12/2024 1256  Last data filed at 10/12/2024 0440  Gross per 24 hour   Intake 1057 ml   Output --   Net 1057 ml         Physical Exam  Vitals and nursing note reviewed.   HENT:      Head: Normocephalic and atraumatic.   Eyes:      Conjunctiva/sclera: Conjunctivae normal.   Neck:      Vascular: No JVD.   Cardiovascular:      Rate and Rhythm: Normal rate.      Heart sounds: Normal heart sounds.   Pulmonary:      Effort: Pulmonary effort is normal.      Breath sounds: Normal breath sounds.   Abdominal:      Palpations: Abdomen is soft.      Tenderness: There is abdominal tenderness.      Comments:  On left lower side    Skin:     General: Skin is warm.   Neurological:      Mental Status: She is alert and oriented to person, place, and time.   Psychiatric:         Mood and Affect: Mood normal.             Significant Labs: All pertinent labs within the past 24 hours have been reviewed.  BMP:   Recent Labs   Lab 10/12/24  1029         K 4.6      CO2 28   BUN 10   CREATININE 0.7   CALCIUM 8.9   MG 2.1     CBC:   Recent Labs   Lab 10/10/24  1511 10/11/24  0440 10/12/24  1029   WBC 12.17 9.79 9.40   HGB 12.5 11.3* 12.2   HCT 37.4 34.9* 37.7    284 299       Significant  Imaging: I have reviewed all pertinent imaging results/findings within the past 24 hours.

## 2024-11-14 ENCOUNTER — LAB VISIT (OUTPATIENT)
Dept: LAB | Facility: HOSPITAL | Age: 39
End: 2024-11-14
Attending: NURSE PRACTITIONER
Payer: MEDICAID

## 2024-11-14 DIAGNOSIS — R41.3 MEMORY LOSS: Primary | ICD-10-CM

## 2024-11-14 DIAGNOSIS — R70.0 ELEVATED SEDIMENTATION RATE: ICD-10-CM

## 2024-11-14 LAB
AMMONIA PLAS-SCNC: 27 UMOL/L (ref 10–50)
BUN SERPL-MCNC: 10 MG/DL (ref 6–20)
CREAT SERPL-MCNC: 0.7 MG/DL (ref 0.5–1.4)
ERYTHROCYTE [SEDIMENTATION RATE] IN BLOOD BY WESTERGREN METHOD: 15 MM/HR (ref 0–20)
EST. GFR  (NO RACE VARIABLE): >60 ML/MIN/1.73 M^2
FOLATE SERPL-MCNC: 7.7 NG/ML (ref 4–24)
TREPONEMA PALLIDUM IGG+IGM AB [PRESENCE] IN SERUM OR PLASMA BY IMMUNOASSAY: NONREACTIVE
TSH SERPL DL<=0.005 MIU/L-ACNC: 4.34 UIU/ML (ref 0.34–5.6)
VIT B12 SERPL-MCNC: 195 PG/ML (ref 210–950)

## 2024-11-14 PROCEDURE — 82607 VITAMIN B-12: CPT | Performed by: NURSE PRACTITIONER

## 2024-11-14 PROCEDURE — 82140 ASSAY OF AMMONIA: CPT | Performed by: NURSE PRACTITIONER

## 2024-11-14 PROCEDURE — 82565 ASSAY OF CREATININE: CPT | Performed by: NURSE PRACTITIONER

## 2024-11-14 PROCEDURE — 85651 RBC SED RATE NONAUTOMATED: CPT | Performed by: NURSE PRACTITIONER

## 2024-11-14 PROCEDURE — 86038 ANTINUCLEAR ANTIBODIES: CPT | Performed by: NURSE PRACTITIONER

## 2024-11-14 PROCEDURE — 84443 ASSAY THYROID STIM HORMONE: CPT | Performed by: NURSE PRACTITIONER

## 2024-11-14 PROCEDURE — 82746 ASSAY OF FOLIC ACID SERUM: CPT | Performed by: NURSE PRACTITIONER

## 2024-11-14 PROCEDURE — 86593 SYPHILIS TEST NON-TREP QUANT: CPT | Performed by: NURSE PRACTITIONER

## 2024-11-14 PROCEDURE — 84520 ASSAY OF UREA NITROGEN: CPT | Performed by: NURSE PRACTITIONER

## 2024-11-14 PROCEDURE — 84425 ASSAY OF VITAMIN B-1: CPT | Performed by: NURSE PRACTITIONER

## 2024-11-15 LAB — ANA TITR SER IF: NEGATIVE {TITER}

## 2024-11-19 LAB — VIT B1 BLD-SCNC: 121.3 NMOL/L (ref 66.5–200)

## 2025-02-06 DIAGNOSIS — R41.3 MEMORY CHANGES: ICD-10-CM

## 2025-02-06 DIAGNOSIS — R53.82 CHRONIC FATIGUE: Primary | ICD-10-CM

## 2025-02-12 ENCOUNTER — HOSPITAL ENCOUNTER (OUTPATIENT)
Dept: RADIOLOGY | Facility: HOSPITAL | Age: 40
Discharge: HOME | End: 2025-02-12
Attending: NURSE PRACTITIONER
Payer: MEDICAID

## 2025-02-12 DIAGNOSIS — R53.82 CHRONIC FATIGUE: ICD-10-CM

## 2025-02-12 DIAGNOSIS — R41.3 MEMORY CHANGES: ICD-10-CM

## 2025-02-12 PROCEDURE — 25500020 PHARM REV CODE 255: Mod: PO | Performed by: NURSE PRACTITIONER

## 2025-02-12 PROCEDURE — 70553 MRI BRAIN STEM W/O & W/DYE: CPT | Mod: TC,PO

## 2025-02-12 PROCEDURE — 70553 MRI BRAIN STEM W/O & W/DYE: CPT | Mod: 26,,, | Performed by: RADIOLOGY

## 2025-02-12 PROCEDURE — A9585 GADOBUTROL INJECTION: HCPCS | Mod: PO | Performed by: NURSE PRACTITIONER

## 2025-02-12 RX ORDER — GADOBUTROL 604.72 MG/ML
11.5 INJECTION INTRAVENOUS
Status: COMPLETED | OUTPATIENT
Start: 2025-02-12 | End: 2025-02-12

## 2025-02-12 RX ADMIN — GADOBUTROL 11.5 ML: 604.72 INJECTION INTRAVENOUS at 11:02

## 2025-04-07 DIAGNOSIS — Z12.31 ENCOUNTER FOR SCREENING MAMMOGRAM FOR MALIGNANT NEOPLASM OF BREAST: Primary | ICD-10-CM

## 2025-04-17 ENCOUNTER — HOSPITAL ENCOUNTER (OUTPATIENT)
Dept: RADIOLOGY | Facility: HOSPITAL | Age: 40
Discharge: HOME | End: 2025-04-17
Attending: INTERNAL MEDICINE
Payer: MEDICAID

## 2025-04-17 VITALS — HEIGHT: 62 IN | WEIGHT: 257 LBS | BODY MASS INDEX: 47.29 KG/M2

## 2025-04-17 DIAGNOSIS — Z12.31 ENCOUNTER FOR SCREENING MAMMOGRAM FOR MALIGNANT NEOPLASM OF BREAST: ICD-10-CM

## 2025-04-17 PROCEDURE — 77067 SCR MAMMO BI INCL CAD: CPT | Mod: 26,,, | Performed by: RADIOLOGY

## 2025-04-17 PROCEDURE — 77063 BREAST TOMOSYNTHESIS BI: CPT | Mod: 26,,, | Performed by: RADIOLOGY

## 2025-04-17 PROCEDURE — 77063 BREAST TOMOSYNTHESIS BI: CPT | Mod: TC,PO

## 2025-05-26 ENCOUNTER — LAB VISIT (OUTPATIENT)
Dept: LAB | Facility: HOSPITAL | Age: 40
End: 2025-05-26
Payer: MEDICAID

## 2025-05-26 ENCOUNTER — OFFICE VISIT (OUTPATIENT)
Facility: CLINIC | Age: 40
End: 2025-05-26
Payer: MEDICAID

## 2025-05-26 VITALS
BODY MASS INDEX: 48.64 KG/M2 | HEIGHT: 62 IN | DIASTOLIC BLOOD PRESSURE: 77 MMHG | SYSTOLIC BLOOD PRESSURE: 115 MMHG | WEIGHT: 264.31 LBS | HEART RATE: 72 BPM

## 2025-05-26 DIAGNOSIS — G62.9 ACQUIRED POLYNEUROPATHY: ICD-10-CM

## 2025-05-26 DIAGNOSIS — R20.0 NUMBNESS AND TINGLING OF BOTH LOWER EXTREMITIES: ICD-10-CM

## 2025-05-26 DIAGNOSIS — R20.2 NUMBNESS AND TINGLING OF BOTH LOWER EXTREMITIES: ICD-10-CM

## 2025-05-26 DIAGNOSIS — R20.2 NUMBNESS AND TINGLING OF BOTH UPPER EXTREMITIES: ICD-10-CM

## 2025-05-26 DIAGNOSIS — R20.0 NUMBNESS AND TINGLING OF BOTH UPPER EXTREMITIES: ICD-10-CM

## 2025-05-26 DIAGNOSIS — G62.9 SENSORIMOTOR NEUROPATHY: ICD-10-CM

## 2025-05-26 DIAGNOSIS — R20.2 PARESTHESIA: Primary | ICD-10-CM

## 2025-05-26 LAB — FOLATE SERPL-MCNC: 11.2 NG/ML (ref 4–24)

## 2025-05-26 PROCEDURE — 3074F SYST BP LT 130 MM HG: CPT | Mod: CPTII,,,

## 2025-05-26 PROCEDURE — 99204 OFFICE O/P NEW MOD 45 MIN: CPT | Mod: S$PBB,,,

## 2025-05-26 PROCEDURE — 83921 ORGANIC ACID SINGLE QUANT: CPT

## 2025-05-26 PROCEDURE — 83655 ASSAY OF LEAD: CPT

## 2025-05-26 PROCEDURE — 83521 IG LIGHT CHAINS FREE EACH: CPT

## 2025-05-26 PROCEDURE — 99999 PR PBB SHADOW E&M-EST. PATIENT-LVL III: CPT | Mod: PBBFAC,,,

## 2025-05-26 PROCEDURE — 82746 ASSAY OF FOLIC ACID SERUM: CPT

## 2025-05-26 PROCEDURE — 84207 ASSAY OF VITAMIN B-6: CPT

## 2025-05-26 PROCEDURE — 82607 VITAMIN B-12: CPT

## 2025-05-26 PROCEDURE — 3078F DIAST BP <80 MM HG: CPT | Mod: CPTII,,,

## 2025-05-26 PROCEDURE — 3008F BODY MASS INDEX DOCD: CPT | Mod: CPTII,,,

## 2025-05-26 PROCEDURE — 1159F MED LIST DOCD IN RCRD: CPT | Mod: CPTII,,,

## 2025-05-26 PROCEDURE — 99213 OFFICE O/P EST LOW 20 MIN: CPT | Mod: PBBFAC

## 2025-05-26 PROCEDURE — 36415 COLL VENOUS BLD VENIPUNCTURE: CPT

## 2025-05-26 PROCEDURE — 84165 PROTEIN E-PHORESIS SERUM: CPT

## 2025-05-26 PROCEDURE — 86334 IMMUNOFIX E-PHORESIS SERUM: CPT
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
## 2025-05-27 ENCOUNTER — RESULTS FOLLOW-UP (OUTPATIENT)
Facility: CLINIC | Age: 40
End: 2025-05-27

## 2025-05-27 LAB
ALBUMIN, SPE (OHS): 3.88 G/DL (ref 3.35–5.55)
ALPHA 1 GLOB (OHS): 0.33 GM/DL (ref 0.17–0.41)
ALPHA 2 GLOB (OHS): 0.76 GM/DL (ref 0.43–0.99)
BETA GLOB (OHS): 0.93 GM/DL (ref 0.5–1.1)
GAMMA GLOBULIN (OHS): 1.3 GM/DL (ref 0.67–1.58)
KAPPA LC FREE SER-MCNC: 1.09 MG/L (ref 0.26–1.65)
KAPPA LC FREE/LAMBDA FREE SER: 2.81 MG/DL (ref 0.33–1.94)
LAMBDA LC FREE SERPL-MCNC: 2.57 MG/DL (ref 0.57–2.63)
PATHOLOGIST INTERPRETATION - IFE SERUM (OHS): NORMAL
PATHOLOGIST REVIEW - SPE (OHS): NORMAL
PROT SERPL-MCNC: 7.2 GM/DL (ref 6–8.4)

## 2025-05-28 DIAGNOSIS — R79.89 LOW VITAMIN B12 LEVEL: Primary | ICD-10-CM

## 2025-05-28 LAB
ADDRESS: NORMAL
ARSENIC BLD-MCNC: <1 NG/ML
ATTENDING PHYSICIAN NAME: NORMAL
CADMIUM BLD-MCNC: 0.5 NG/ML
COUNTY OF RESIDENCE: NORMAL
EMPLOYER NAME: NORMAL
FACILITY PHONE #: NORMAL
HX OF OCCUPATION: NORMAL
LEAD BLDV-MCNC: <1 MCG/DL
M HEALTH CARE PROVIDER PHONE: NORMAL
M PATIENT CITY: NORMAL
MERCURY BLD-MCNC: <1 NG/ML
PHONE #: NORMAL
PROVIDER CITY: NORMAL
PROVIDER POSTAL CODE: NORMAL
PROVIDER STATE: NORMAL
REFER PHYSICIAN ADDR: NORMAL
SPECIMEN SOURCE: NORMAL
VIT B12 SERPL-MCNC: 241 NG/L (ref 180–914)

## 2025-05-28 RX ORDER — CYANOCOBALAMIN 1000 UG/ML
INJECTION, SOLUTION INTRAMUSCULAR; SUBCUTANEOUS
Qty: 1 ML | Refills: 8 | Status: SHIPPED | OUTPATIENT
Start: 2025-05-28 | End: 2025-11-22

## 2025-05-30 LAB
METHYLMALONATE SERPL-SCNC: 0.18 NMOL/ML
W VITAMIN B6: 15 UG/L

## 2025-08-18 ENCOUNTER — PROCEDURE VISIT (OUTPATIENT)
Facility: CLINIC | Age: 40
End: 2025-08-18
Payer: MEDICAID

## 2025-08-18 DIAGNOSIS — G60.8 PERIPHERAL SENSORY-MOTOR AXONAL POLYNEUROPATHY: Primary | ICD-10-CM

## 2025-08-18 DIAGNOSIS — R20.0 NUMBNESS AND TINGLING OF BOTH UPPER EXTREMITIES: ICD-10-CM

## 2025-08-18 DIAGNOSIS — R20.2 NUMBNESS AND TINGLING OF BOTH LOWER EXTREMITIES: ICD-10-CM

## 2025-08-18 DIAGNOSIS — R20.2 NUMBNESS AND TINGLING OF BOTH UPPER EXTREMITIES: ICD-10-CM

## 2025-08-18 DIAGNOSIS — R20.0 NUMBNESS AND TINGLING OF BOTH LOWER EXTREMITIES: ICD-10-CM

## 2025-08-18 PROCEDURE — 95913 NRV CNDJ TEST 13/> STUDIES: CPT | Mod: PBBFAC | Performed by: PSYCHIATRY & NEUROLOGY

## 2025-08-18 PROCEDURE — 95886 MUSC TEST DONE W/N TEST COMP: CPT | Mod: 26,S$PBB,, | Performed by: PSYCHIATRY & NEUROLOGY

## 2025-08-18 PROCEDURE — 95885 MUSC TST DONE W/NERV TST LIM: CPT | Mod: 26,XS,S$PBB, | Performed by: PSYCHIATRY & NEUROLOGY

## 2025-08-18 PROCEDURE — 95913 NRV CNDJ TEST 13/> STUDIES: CPT | Mod: 26,S$PBB,, | Performed by: PSYCHIATRY & NEUROLOGY

## 2025-08-18 PROCEDURE — 95885 MUSC TST DONE W/NERV TST LIM: CPT | Mod: XS,PBBFAC | Performed by: PSYCHIATRY & NEUROLOGY

## 2025-08-18 PROCEDURE — 95886 MUSC TEST DONE W/N TEST COMP: CPT | Mod: PBBFAC | Performed by: PSYCHIATRY & NEUROLOGY

## 2025-08-20 ENCOUNTER — TELEPHONE (OUTPATIENT)
Facility: CLINIC | Age: 40
End: 2025-08-20
Payer: MEDICAID

## 2025-09-02 ENCOUNTER — OFFICE VISIT (OUTPATIENT)
Facility: CLINIC | Age: 40
End: 2025-09-02
Payer: MEDICAID

## 2025-09-02 VITALS
WEIGHT: 268.75 LBS | HEIGHT: 62 IN | HEART RATE: 62 BPM | SYSTOLIC BLOOD PRESSURE: 119 MMHG | BODY MASS INDEX: 49.45 KG/M2 | DIASTOLIC BLOOD PRESSURE: 76 MMHG

## 2025-09-02 DIAGNOSIS — G56.03 BILATERAL CARPAL TUNNEL SYNDROME: ICD-10-CM

## 2025-09-02 DIAGNOSIS — G60.8 PERIPHERAL SENSORY-MOTOR AXONAL POLYNEUROPATHY: Primary | ICD-10-CM

## 2025-09-02 PROCEDURE — 99215 OFFICE O/P EST HI 40 MIN: CPT | Mod: S$PBB,,,

## 2025-09-02 PROCEDURE — 3078F DIAST BP <80 MM HG: CPT | Mod: CPTII,,,

## 2025-09-02 PROCEDURE — 99213 OFFICE O/P EST LOW 20 MIN: CPT | Mod: PBBFAC

## 2025-09-02 PROCEDURE — 3008F BODY MASS INDEX DOCD: CPT | Mod: CPTII,,,

## 2025-09-02 PROCEDURE — 99999 PR PBB SHADOW E&M-EST. PATIENT-LVL III: CPT | Mod: PBBFAC,,,

## 2025-09-02 PROCEDURE — 3074F SYST BP LT 130 MM HG: CPT | Mod: CPTII,,,

## 2025-09-02 PROCEDURE — 1159F MED LIST DOCD IN RCRD: CPT | Mod: CPTII,,,

## 2025-09-04 ENCOUNTER — TELEPHONE (OUTPATIENT)
Facility: CLINIC | Age: 40
End: 2025-09-04
Payer: MEDICAID